# Patient Record
Sex: MALE | Race: WHITE | NOT HISPANIC OR LATINO | URBAN - METROPOLITAN AREA
[De-identification: names, ages, dates, MRNs, and addresses within clinical notes are randomized per-mention and may not be internally consistent; named-entity substitution may affect disease eponyms.]

---

## 2019-05-02 ENCOUNTER — INPATIENT (INPATIENT)
Facility: HOSPITAL | Age: 84
LOS: 6 days | Discharge: NURSING, REHAB OR LONG-TERM CARE | DRG: 871 | End: 2019-05-09
Attending: INTERNAL MEDICINE | Admitting: INTERNAL MEDICINE
Payer: MEDICARE

## 2019-05-02 VITALS
WEIGHT: 199.96 LBS | OXYGEN SATURATION: 93 % | SYSTOLIC BLOOD PRESSURE: 143 MMHG | DIASTOLIC BLOOD PRESSURE: 95 MMHG | RESPIRATION RATE: 32 BRPM | TEMPERATURE: 102 F | HEART RATE: 136 BPM | HEIGHT: 75 IN

## 2019-05-02 DIAGNOSIS — G20 PARKINSON'S DISEASE: ICD-10-CM

## 2019-05-02 DIAGNOSIS — Z95.1 PRESENCE OF AORTOCORONARY BYPASS GRAFT: Chronic | ICD-10-CM

## 2019-05-02 DIAGNOSIS — R09.02 HYPOXEMIA: ICD-10-CM

## 2019-05-02 DIAGNOSIS — Z29.9 ENCOUNTER FOR PROPHYLACTIC MEASURES, UNSPECIFIED: ICD-10-CM

## 2019-05-02 DIAGNOSIS — I10 ESSENTIAL (PRIMARY) HYPERTENSION: ICD-10-CM

## 2019-05-02 DIAGNOSIS — N18.9 CHRONIC KIDNEY DISEASE, UNSPECIFIED: ICD-10-CM

## 2019-05-02 DIAGNOSIS — E78.5 HYPERLIPIDEMIA, UNSPECIFIED: ICD-10-CM

## 2019-05-02 DIAGNOSIS — F03.90 UNSPECIFIED DEMENTIA WITHOUT BEHAVIORAL DISTURBANCE: ICD-10-CM

## 2019-05-02 DIAGNOSIS — J18.9 PNEUMONIA, UNSPECIFIED ORGANISM: ICD-10-CM

## 2019-05-02 DIAGNOSIS — I48.91 UNSPECIFIED ATRIAL FIBRILLATION: ICD-10-CM

## 2019-05-02 DIAGNOSIS — I63.9 CEREBRAL INFARCTION, UNSPECIFIED: ICD-10-CM

## 2019-05-02 LAB
24R-OH-CALCIDIOL SERPL-MCNC: 47.5 NG/ML — SIGNIFICANT CHANGE UP (ref 30–80)
ALBUMIN SERPL ELPH-MCNC: 2.1 G/DL — LOW (ref 3.5–5)
ALP SERPL-CCNC: 126 U/L — HIGH (ref 40–120)
ALT FLD-CCNC: 52 U/L DA — SIGNIFICANT CHANGE UP (ref 10–60)
ANION GAP SERPL CALC-SCNC: 4 MMOL/L — LOW (ref 5–17)
ANION GAP SERPL CALC-SCNC: 5 MMOL/L — SIGNIFICANT CHANGE UP (ref 5–17)
APPEARANCE UR: CLEAR — SIGNIFICANT CHANGE UP
APTT BLD: 59.6 SEC — HIGH (ref 27.5–36.3)
AST SERPL-CCNC: 93 U/L — HIGH (ref 10–40)
BASE EXCESS BLDA CALC-SCNC: -0.2 MMOL/L — SIGNIFICANT CHANGE UP (ref -2–2)
BASE EXCESS BLDA CALC-SCNC: 0.4 MMOL/L — SIGNIFICANT CHANGE UP (ref -2–2)
BASE EXCESS BLDV CALC-SCNC: -2.2 MMOL/L — LOW (ref -2–2)
BASOPHILS # BLD AUTO: 0.03 K/UL — SIGNIFICANT CHANGE UP (ref 0–0.2)
BASOPHILS NFR BLD AUTO: 0.2 % — SIGNIFICANT CHANGE UP (ref 0–2)
BILIRUB SERPL-MCNC: 1.6 MG/DL — HIGH (ref 0.2–1.2)
BILIRUB UR-MCNC: ABNORMAL
BLOOD GAS COMMENTS ARTERIAL: SIGNIFICANT CHANGE UP
BLOOD GAS COMMENTS ARTERIAL: SIGNIFICANT CHANGE UP
BUN SERPL-MCNC: 19 MG/DL — HIGH (ref 7–18)
BUN SERPL-MCNC: 21 MG/DL — HIGH (ref 7–18)
CALCIUM SERPL-MCNC: 7.4 MG/DL — LOW (ref 8.4–10.5)
CALCIUM SERPL-MCNC: 7.9 MG/DL — LOW (ref 8.4–10.5)
CHLORIDE SERPL-SCNC: 110 MMOL/L — HIGH (ref 96–108)
CHLORIDE SERPL-SCNC: 113 MMOL/L — HIGH (ref 96–108)
CHOLEST SERPL-MCNC: <50 MG/DL — SIGNIFICANT CHANGE UP (ref 10–199)
CO2 SERPL-SCNC: 26 MMOL/L — SIGNIFICANT CHANGE UP (ref 22–31)
CO2 SERPL-SCNC: 27 MMOL/L — SIGNIFICANT CHANGE UP (ref 22–31)
COLOR SPEC: YELLOW — SIGNIFICANT CHANGE UP
CREAT SERPL-MCNC: 1.18 MG/DL — SIGNIFICANT CHANGE UP (ref 0.5–1.3)
CREAT SERPL-MCNC: 1.36 MG/DL — HIGH (ref 0.5–1.3)
DIFF PNL FLD: ABNORMAL
EOSINOPHIL # BLD AUTO: 0.04 K/UL — SIGNIFICANT CHANGE UP (ref 0–0.5)
EOSINOPHIL NFR BLD AUTO: 0.2 % — SIGNIFICANT CHANGE UP (ref 0–6)
FLU A RESULT: SIGNIFICANT CHANGE UP
FLU A RESULT: SIGNIFICANT CHANGE UP
FLUAV AG NPH QL: SIGNIFICANT CHANGE UP
FLUBV AG NPH QL: SIGNIFICANT CHANGE UP
GLUCOSE SERPL-MCNC: 84 MG/DL — SIGNIFICANT CHANGE UP (ref 70–99)
GLUCOSE SERPL-MCNC: 96 MG/DL — SIGNIFICANT CHANGE UP (ref 70–99)
GLUCOSE UR QL: NEGATIVE — SIGNIFICANT CHANGE UP
HBA1C BLD-MCNC: 6.1 % — HIGH (ref 4–5.6)
HCO3 BLDA-SCNC: 23 MMOL/L — SIGNIFICANT CHANGE UP (ref 23–27)
HCO3 BLDA-SCNC: 24 MMOL/L — SIGNIFICANT CHANGE UP (ref 23–27)
HCO3 BLDV-SCNC: 24 MMOL/L — SIGNIFICANT CHANGE UP (ref 21–29)
HCT VFR BLD CALC: 33.6 % — LOW (ref 39–50)
HCT VFR BLD CALC: 40.7 % — SIGNIFICANT CHANGE UP (ref 39–50)
HDLC SERPL-MCNC: 27 MG/DL — LOW
HGB BLD-MCNC: 10.8 G/DL — LOW (ref 13–17)
HGB BLD-MCNC: 12.9 G/DL — LOW (ref 13–17)
HOROWITZ INDEX BLDA+IHG-RTO: 50 — SIGNIFICANT CHANGE UP
HOROWITZ INDEX BLDA+IHG-RTO: 60 — SIGNIFICANT CHANGE UP
HOROWITZ INDEX BLDV+IHG-RTO: 21 — SIGNIFICANT CHANGE UP
IMM GRANULOCYTES NFR BLD AUTO: 0.4 % — SIGNIFICANT CHANGE UP (ref 0–1.5)
INR BLD: 8.28 RATIO — CRITICAL HIGH (ref 0.88–1.16)
KETONES UR-MCNC: ABNORMAL
LACTATE SERPL-SCNC: 1.8 MMOL/L — SIGNIFICANT CHANGE UP (ref 0.7–2)
LACTATE SERPL-SCNC: 3 MMOL/L — HIGH (ref 0.7–2)
LEUKOCYTE ESTERASE UR-ACNC: ABNORMAL
LIDOCAIN IGE QN: 204 U/L — SIGNIFICANT CHANGE UP (ref 73–393)
LIPID PNL WITH DIRECT LDL SERPL: <17 MG/DL — SIGNIFICANT CHANGE UP
LYMPHOCYTES # BLD AUTO: 0.52 K/UL — LOW (ref 1–3.3)
LYMPHOCYTES # BLD AUTO: 3.2 % — LOW (ref 13–44)
MAGNESIUM SERPL-MCNC: 1.7 MG/DL — SIGNIFICANT CHANGE UP (ref 1.6–2.6)
MCHC RBC-ENTMCNC: 28.2 PG — SIGNIFICANT CHANGE UP (ref 27–34)
MCHC RBC-ENTMCNC: 28.4 PG — SIGNIFICANT CHANGE UP (ref 27–34)
MCHC RBC-ENTMCNC: 31.7 GM/DL — LOW (ref 32–36)
MCHC RBC-ENTMCNC: 32.1 GM/DL — SIGNIFICANT CHANGE UP (ref 32–36)
MCV RBC AUTO: 87.7 FL — SIGNIFICANT CHANGE UP (ref 80–100)
MCV RBC AUTO: 89.5 FL — SIGNIFICANT CHANGE UP (ref 80–100)
MONOCYTES # BLD AUTO: 0.6 K/UL — SIGNIFICANT CHANGE UP (ref 0–0.9)
MONOCYTES NFR BLD AUTO: 3.7 % — SIGNIFICANT CHANGE UP (ref 2–14)
NEUTROPHILS # BLD AUTO: 14.87 K/UL — HIGH (ref 1.8–7.4)
NEUTROPHILS NFR BLD AUTO: 92.3 % — HIGH (ref 43–77)
NITRITE UR-MCNC: NEGATIVE — SIGNIFICANT CHANGE UP
NRBC # BLD: 0 /100 WBCS — SIGNIFICANT CHANGE UP (ref 0–0)
NRBC # BLD: 0 /100 WBCS — SIGNIFICANT CHANGE UP (ref 0–0)
NT-PROBNP SERPL-SCNC: 5858 PG/ML — HIGH (ref 0–450)
PCO2 BLDA: 34 MMHG — SIGNIFICANT CHANGE UP (ref 32–46)
PCO2 BLDA: 38 MMHG — SIGNIFICANT CHANGE UP (ref 32–46)
PCO2 BLDV: 49 MMHG — SIGNIFICANT CHANGE UP (ref 35–50)
PH BLDA: 7.42 — SIGNIFICANT CHANGE UP (ref 7.35–7.45)
PH BLDA: 7.44 — SIGNIFICANT CHANGE UP (ref 7.35–7.45)
PH BLDV: 7.31 — LOW (ref 7.35–7.45)
PH UR: 5 — SIGNIFICANT CHANGE UP (ref 5–8)
PHOSPHATE SERPL-MCNC: 2.7 MG/DL — SIGNIFICANT CHANGE UP (ref 2.5–4.5)
PLATELET # BLD AUTO: 153 K/UL — SIGNIFICANT CHANGE UP (ref 150–400)
PLATELET # BLD AUTO: 232 K/UL — SIGNIFICANT CHANGE UP (ref 150–400)
PO2 BLDA: 117 MMHG — HIGH (ref 74–108)
PO2 BLDA: 77 MMHG — SIGNIFICANT CHANGE UP (ref 74–108)
PO2 BLDV: 30 MMHG — SIGNIFICANT CHANGE UP (ref 25–45)
POTASSIUM SERPL-MCNC: 3.2 MMOL/L — LOW (ref 3.5–5.3)
POTASSIUM SERPL-MCNC: 4.7 MMOL/L — SIGNIFICANT CHANGE UP (ref 3.5–5.3)
POTASSIUM SERPL-SCNC: 3.2 MMOL/L — LOW (ref 3.5–5.3)
POTASSIUM SERPL-SCNC: 4.7 MMOL/L — SIGNIFICANT CHANGE UP (ref 3.5–5.3)
PROT SERPL-MCNC: 6.6 G/DL — SIGNIFICANT CHANGE UP (ref 6–8.3)
PROT UR-MCNC: 30 MG/DL
PROTHROM AB SERPL-ACNC: 98.1 SEC — HIGH (ref 10–12.9)
RBC # BLD: 3.83 M/UL — LOW (ref 4.2–5.8)
RBC # BLD: 4.55 M/UL — SIGNIFICANT CHANGE UP (ref 4.2–5.8)
RBC # FLD: 18.2 % — HIGH (ref 10.3–14.5)
RBC # FLD: 18.5 % — HIGH (ref 10.3–14.5)
RSV RESULT: SIGNIFICANT CHANGE UP
RSV RNA RESP QL NAA+PROBE: SIGNIFICANT CHANGE UP
SAO2 % BLDA: 94 % — SIGNIFICANT CHANGE UP (ref 92–96)
SAO2 % BLDA: 98 % — HIGH (ref 92–96)
SAO2 % BLDV: 48 % — LOW (ref 67–88)
SODIUM SERPL-SCNC: 142 MMOL/L — SIGNIFICANT CHANGE UP (ref 135–145)
SODIUM SERPL-SCNC: 143 MMOL/L — SIGNIFICANT CHANGE UP (ref 135–145)
SP GR SPEC: 1.02 — SIGNIFICANT CHANGE UP (ref 1.01–1.02)
TOTAL CHOLESTEROL/HDL RATIO MEASUREMENT: <1.9 RATIO — LOW (ref 3.4–9.6)
TRIGL SERPL-MCNC: 30 MG/DL — SIGNIFICANT CHANGE UP (ref 10–149)
TROPONIN I SERPL-MCNC: 0.1 NG/ML — HIGH (ref 0–0.04)
TROPONIN I SERPL-MCNC: 1.57 NG/ML — HIGH (ref 0–0.04)
TSH SERPL-MCNC: 0.21 UU/ML — LOW (ref 0.34–4.82)
UROBILINOGEN FLD QL: 1
VIT B12 SERPL-MCNC: 1410 PG/ML — HIGH (ref 232–1245)
WBC # BLD: 16.13 K/UL — HIGH (ref 3.8–10.5)
WBC # BLD: 19.99 K/UL — HIGH (ref 3.8–10.5)
WBC # FLD AUTO: 16.13 K/UL — HIGH (ref 3.8–10.5)
WBC # FLD AUTO: 19.99 K/UL — HIGH (ref 3.8–10.5)

## 2019-05-02 PROCEDURE — 99284 EMERGENCY DEPT VISIT MOD MDM: CPT | Mod: 25

## 2019-05-02 PROCEDURE — 93010 ELECTROCARDIOGRAM REPORT: CPT | Mod: 76

## 2019-05-02 PROCEDURE — 71045 X-RAY EXAM CHEST 1 VIEW: CPT | Mod: 26

## 2019-05-02 RX ORDER — CARBIDOPA AND LEVODOPA 25; 100 MG/1; MG/1
1 TABLET ORAL
Qty: 0 | Refills: 0 | COMMUNITY

## 2019-05-02 RX ORDER — ATORVASTATIN CALCIUM 80 MG/1
40 TABLET, FILM COATED ORAL AT BEDTIME
Qty: 0 | Refills: 0 | Status: DISCONTINUED | OUTPATIENT
Start: 2019-05-02 | End: 2019-05-09

## 2019-05-02 RX ORDER — VANCOMYCIN HCL 1 G
1000 VIAL (EA) INTRAVENOUS ONCE
Qty: 0 | Refills: 0 | Status: COMPLETED | OUTPATIENT
Start: 2019-05-02 | End: 2019-05-02

## 2019-05-02 RX ORDER — CEFEPIME 1 G/1
2000 INJECTION, POWDER, FOR SOLUTION INTRAMUSCULAR; INTRAVENOUS ONCE
Qty: 0 | Refills: 0 | Status: COMPLETED | OUTPATIENT
Start: 2019-05-02 | End: 2019-05-02

## 2019-05-02 RX ORDER — POTASSIUM CHLORIDE 20 MEQ
10 PACKET (EA) ORAL EVERY 4 HOURS
Qty: 0 | Refills: 0 | Status: COMPLETED | OUTPATIENT
Start: 2019-05-02 | End: 2019-05-02

## 2019-05-02 RX ORDER — TAMSULOSIN HYDROCHLORIDE 0.4 MG/1
1 CAPSULE ORAL
Qty: 0 | Refills: 0 | COMMUNITY

## 2019-05-02 RX ORDER — METOPROLOL TARTRATE 50 MG
1 TABLET ORAL
Qty: 0 | Refills: 0 | COMMUNITY

## 2019-05-02 RX ORDER — PIPERACILLIN AND TAZOBACTAM 4; .5 G/20ML; G/20ML
3.38 INJECTION, POWDER, LYOPHILIZED, FOR SOLUTION INTRAVENOUS EVERY 8 HOURS
Qty: 0 | Refills: 0 | Status: DISCONTINUED | OUTPATIENT
Start: 2019-05-02 | End: 2019-05-09

## 2019-05-02 RX ORDER — FINASTERIDE 5 MG/1
1 TABLET, FILM COATED ORAL
Qty: 0 | Refills: 0 | COMMUNITY

## 2019-05-02 RX ORDER — CHOLECALCIFEROL (VITAMIN D3) 125 MCG
1 CAPSULE ORAL
Qty: 0 | Refills: 0 | COMMUNITY

## 2019-05-02 RX ORDER — VANCOMYCIN HCL 1 G
1000 VIAL (EA) INTRAVENOUS EVERY 24 HOURS
Qty: 0 | Refills: 0 | Status: DISCONTINUED | OUTPATIENT
Start: 2019-05-02 | End: 2019-05-03

## 2019-05-02 RX ORDER — KETOROLAC TROMETHAMINE 30 MG/ML
30 SYRINGE (ML) INJECTION ONCE
Qty: 0 | Refills: 0 | Status: DISCONTINUED | OUTPATIENT
Start: 2019-05-02 | End: 2019-05-02

## 2019-05-02 RX ORDER — PYRIDOXINE HCL (VITAMIN B6) 100 MG
1 TABLET ORAL
Qty: 0 | Refills: 0 | COMMUNITY

## 2019-05-02 RX ORDER — ATORVASTATIN CALCIUM 80 MG/1
1 TABLET, FILM COATED ORAL
Qty: 0 | Refills: 0 | COMMUNITY

## 2019-05-02 RX ORDER — LOSARTAN POTASSIUM 100 MG/1
50 TABLET, FILM COATED ORAL DAILY
Qty: 0 | Refills: 0 | Status: DISCONTINUED | OUTPATIENT
Start: 2019-05-02 | End: 2019-05-03

## 2019-05-02 RX ORDER — ACETAMINOPHEN 500 MG
650 TABLET ORAL ONCE
Qty: 0 | Refills: 0 | Status: DISCONTINUED | OUTPATIENT
Start: 2019-05-02 | End: 2019-05-02

## 2019-05-02 RX ORDER — TAMSULOSIN HYDROCHLORIDE 0.4 MG/1
0.4 CAPSULE ORAL AT BEDTIME
Qty: 0 | Refills: 0 | Status: DISCONTINUED | OUTPATIENT
Start: 2019-05-02 | End: 2019-05-09

## 2019-05-02 RX ORDER — IPRATROPIUM/ALBUTEROL SULFATE 18-103MCG
3 AEROSOL WITH ADAPTER (GRAM) INHALATION EVERY 6 HOURS
Qty: 0 | Refills: 0 | Status: DISCONTINUED | OUTPATIENT
Start: 2019-05-02 | End: 2019-05-09

## 2019-05-02 RX ORDER — FUROSEMIDE 40 MG
1 TABLET ORAL
Qty: 0 | Refills: 0 | COMMUNITY

## 2019-05-02 RX ORDER — SODIUM CHLORIDE 9 MG/ML
2000 INJECTION INTRAMUSCULAR; INTRAVENOUS; SUBCUTANEOUS ONCE
Qty: 0 | Refills: 0 | Status: COMPLETED | OUTPATIENT
Start: 2019-05-02 | End: 2019-05-02

## 2019-05-02 RX ORDER — ACETAMINOPHEN 500 MG
1000 TABLET ORAL ONCE
Qty: 0 | Refills: 0 | Status: COMPLETED | OUTPATIENT
Start: 2019-05-02 | End: 2019-05-02

## 2019-05-02 RX ORDER — IPRATROPIUM/ALBUTEROL SULFATE 18-103MCG
3 AEROSOL WITH ADAPTER (GRAM) INHALATION
Qty: 0 | Refills: 0 | COMMUNITY

## 2019-05-02 RX ORDER — PYRIDOXINE HCL (VITAMIN B6) 100 MG
50 TABLET ORAL DAILY
Qty: 0 | Refills: 0 | Status: DISCONTINUED | OUTPATIENT
Start: 2019-05-02 | End: 2019-05-09

## 2019-05-02 RX ORDER — CHOLECALCIFEROL (VITAMIN D3) 125 MCG
400 CAPSULE ORAL DAILY
Qty: 0 | Refills: 0 | Status: DISCONTINUED | OUTPATIENT
Start: 2019-05-02 | End: 2019-05-09

## 2019-05-02 RX ORDER — FINASTERIDE 5 MG/1
5 TABLET, FILM COATED ORAL DAILY
Qty: 0 | Refills: 0 | Status: DISCONTINUED | OUTPATIENT
Start: 2019-05-02 | End: 2019-05-09

## 2019-05-02 RX ORDER — PREGABALIN 225 MG/1
1 CAPSULE ORAL
Qty: 0 | Refills: 0 | COMMUNITY

## 2019-05-02 RX ORDER — AMANTADINE HCL 100 MG
1 CAPSULE ORAL
Qty: 0 | Refills: 0 | COMMUNITY

## 2019-05-02 RX ORDER — SODIUM CHLORIDE 9 MG/ML
1000 INJECTION INTRAMUSCULAR; INTRAVENOUS; SUBCUTANEOUS
Qty: 0 | Refills: 0 | Status: DISCONTINUED | OUTPATIENT
Start: 2019-05-02 | End: 2019-05-03

## 2019-05-02 RX ORDER — CARBIDOPA AND LEVODOPA 25; 100 MG/1; MG/1
1 TABLET ORAL DAILY
Qty: 0 | Refills: 0 | Status: DISCONTINUED | OUTPATIENT
Start: 2019-05-02 | End: 2019-05-09

## 2019-05-02 RX ADMIN — CEFEPIME 2000 MILLIGRAM(S): 1 INJECTION, POWDER, FOR SOLUTION INTRAMUSCULAR; INTRAVENOUS at 03:15

## 2019-05-02 RX ADMIN — Medication 30 MILLIGRAM(S): at 02:45

## 2019-05-02 RX ADMIN — PIPERACILLIN AND TAZOBACTAM 25 GRAM(S): 4; .5 INJECTION, POWDER, LYOPHILIZED, FOR SOLUTION INTRAVENOUS at 16:58

## 2019-05-02 RX ADMIN — Medication 30 MILLIGRAM(S): at 01:43

## 2019-05-02 RX ADMIN — Medication 1000 MILLIGRAM(S): at 02:30

## 2019-05-02 RX ADMIN — Medication 3 MILLILITER(S): at 15:55

## 2019-05-02 RX ADMIN — Medication 3 MILLILITER(S): at 10:14

## 2019-05-02 RX ADMIN — SODIUM CHLORIDE 2000 MILLILITER(S): 9 INJECTION INTRAMUSCULAR; INTRAVENOUS; SUBCUTANEOUS at 03:30

## 2019-05-02 RX ADMIN — Medication 250 MILLIGRAM(S): at 01:44

## 2019-05-02 RX ADMIN — Medication 1000 MILLIGRAM(S): at 02:45

## 2019-05-02 RX ADMIN — SODIUM CHLORIDE 75 MILLILITER(S): 9 INJECTION INTRAMUSCULAR; INTRAVENOUS; SUBCUTANEOUS at 06:40

## 2019-05-02 RX ADMIN — Medication 100 MILLIEQUIVALENT(S): at 18:07

## 2019-05-02 RX ADMIN — Medication 400 MILLIGRAM(S): at 01:43

## 2019-05-02 RX ADMIN — CEFEPIME 100 MILLIGRAM(S): 1 INJECTION, POWDER, FOR SOLUTION INTRAMUSCULAR; INTRAVENOUS at 01:44

## 2019-05-02 RX ADMIN — Medication 100 MILLIEQUIVALENT(S): at 16:58

## 2019-05-02 RX ADMIN — SODIUM CHLORIDE 2000 MILLILITER(S): 9 INJECTION INTRAMUSCULAR; INTRAVENOUS; SUBCUTANEOUS at 01:43

## 2019-05-02 RX ADMIN — Medication 3 MILLILITER(S): at 21:59

## 2019-05-02 RX ADMIN — Medication 1000 MILLIGRAM(S): at 03:30

## 2019-05-02 NOTE — H&P ADULT - PROBLEM SELECTOR PLAN 10
IMPROVE VTE Individual Risk Assessment          RISK                                                          Points  [  ] Previous VTE                                                3  [  ] Thrombophilia                                             2  [  ] Lower limb paralysis                                   2        (unable to hold up >15 seconds)    [  ] Current Cancer                                             2         (within 6 months)  [ x ] Immobilization > 24 hrs                              1  [  ] ICU/CCU stay > 24 hours                             1  [ x ] Age > 60                                                         1    IMPROVE VTE Score: 2    no need for vte prophylaxis for now given supratherapeutic INR  f/u INR daily

## 2019-05-02 NOTE — H&P ADULT - HISTORY OF PRESENT ILLNESS
84 year old male PMH CAD, CVA, dementia, ckd, BPH, parkinsons, afib coumadin, HTN, CHF, PVD, HLD and recently treate for pna about 2 weeks ago BIBA from NH for SOB. unable to obtain further history from pt 2/2 dementia. 84 year old male PMH CAD, CVA, CKD, BPH, Parkinson's, dementia, Afib on coumadin, HTN, CHF, PVD, HLD was brought in by ambulance from [facility] for shortness of breath. . unable to obtain further history from pt 2/2 dementia. According to ED provider, son states that patient at baseline follows basic commands and is confused; FULL CODE.

## 2019-05-02 NOTE — ED PROVIDER NOTE - OBJECTIVE STATEMENT
84 year old male PMH CAD, CVA, dementia, ckd, BPH, parkinsons, HTN, CHF, PVD, HLD and recently treate for pna about 2 weeks ago BIBA from NH for SOB. unable to obtain further history from pt 2/2 dementia. at baseline follows basic commands and is confused. 84 year old male PMH CAD, CVA, dementia, ckd, BPH, parkinsons, afib coumadin, HTN, CHF, PVD, HLD and recently treate for pna about 2 weeks ago BIBA from NH for SOB. unable to obtain further history from pt 2/2 dementia. at baseline follows basic commands and is confused.

## 2019-05-02 NOTE — H&P ADULT - NSHPPHYSICALEXAM_GEN_ALL_CORE
Vital Signs Last 24 Hrs  T(C): 36.9 (02 May 2019 04:30), Max: 39.1 (02 May 2019 01:06)  T(F): 98.4 (02 May 2019 04:30), Max: 102.4 (02 May 2019 01:06)  HR: 100 (02 May 2019 04:30) (100 - 136)  BP: 110/62 (02 May 2019 04:30) (104/66 - 143/95)  RR: 22 (02 May 2019 04:30) (22 - 32)  SpO2: 98% (02 May 2019 04:30) (93% - 98%)  ________________________________________________  PHYSICAL EXAM:  GENERAL: moderate respiratory distress, flailing around in bed  HEENT: Normocephalic;  conjunctivae and sclerae clear; dry mucous membranes  NECK : supple, no JVD  CHEST/LUNG: Clear to auscuitation bilaterally with good air entry   HEART: S1 S2  regular; no murmurs, gallops or rubs  ABDOMEN: Soft, Nontender, Nondistended; Bowel sounds present  EXTREMITIES: no cyanosis; no edema; no calf tenderness  NERVOUS SYSTEM:  lethargic; opens eyes in response to name; unable to assess orientation due to nonverbal status; patient freely moving all extremities Vital Signs Last 24 Hrs  T(C): 36.9 (02 May 2019 04:30), Max: 39.1 (02 May 2019 01:06)  T(F): 98.4 (02 May 2019 04:30), Max: 102.4 (02 May 2019 01:06)  HR: 100 (02 May 2019 04:30) (100 - 136)  BP: 110/62 (02 May 2019 04:30) (104/66 - 143/95)  RR: 22 (02 May 2019 04:30) (22 - 32)  SpO2: 98% (02 May 2019 04:30) (93% - 98%)  ________________________________________________  PHYSICAL EXAM:  GENERAL: moderate respiratory distress, flailing around in bed  HEENT: Normocephalic;  conjunctivae and sclerae clear; dry mucous membranes  NECK : supple, no JVD  CHEST/LUNG: Clear to auscuitation bilaterally with good air entry   HEART: S1 S2  regular; no murmurs, gallops or rubs  ABDOMEN: Soft, Nontender, Nondistended; Bowel sounds present  EXTREMITIES: no cyanosis; no edema; no calf tenderness  NERVOUS SYSTEM:  lethargic; opens eyes in response to name; unable to assess orientation due to nonverbal status; patient freely moving all extremities; not necessarily following commands

## 2019-05-02 NOTE — H&P ADULT - PROBLEM SELECTOR PLAN 9
Cr 1.36 on admission, approximately around baseline  continue to monitor BP  renally dose all medications

## 2019-05-02 NOTE — H&P ADULT - PROBLEM SELECTOR PLAN 1
initially started on BIPAP in ED with improvement in hypoxia, transitioned to high flow nasal cannula 50% FiO2@50Lpm with O2 sat at 100%  CXR left upper lobe infiltrate  likely pneumonia in setting of fever, tachycardia, leukocytosis, recently treated on PO antibiotics 8 days ago as per NH  will continue with vancomycin, zosyn for now, treating for HAP  f/u blood cultures, strep antigen, urine legionella  f/u ID consult  continue to monitor respiratory status  patient still encephalopathic, likely secondary to sepsis  keep pt NPO for now initially started on BIPAP in ED with improvement in hypoxia, transitioned to high flow nasal cannula 50% FiO2@50Lpm with O2 sat at 100%  CXR left upper lobe infiltrate  likely pneumonia in setting of fever, tachycardia, leukocytosis, recently treated on PO antibiotics 8 days ago as per NH  will continue with vancomycin, zosyn for now, treating for HAP  f/u blood cultures, strep antigen, urine legionella  f/u ID consult  continue to monitor respiratory status  patient still encephalopathic, likely secondary to sepsis  keep pt NPO for now  NSTEMI with intraventricular block - initial trop 0.1, f/u T2, T3 - avoid AV galina blocking agents

## 2019-05-02 NOTE — SWALLOW BEDSIDE ASSESSMENT ADULT - PHARYNGEAL PHASE
Delayed pharyngeal swallow/Absent laryngeal elevation/Absent trigger of swallow/Decreased laryngeal elevation/Throat clear post oral intake

## 2019-05-02 NOTE — H&P ADULT - PROBLEM SELECTOR PLAN 3
supratherapeutic INR on admission to 8.3  no evidence of bleeding  holding coumadin for now  f/u INR daily

## 2019-05-02 NOTE — H&P ADULT - ASSESSMENT
Patient admitted for shortness of breath, found to be in sepsis likely secondary to UTI, initially started on BIPAP in ED and transitioned to high flow nasal cannula on 50% FiO2.

## 2019-05-02 NOTE — H&P ADULT - ATTENDING COMMENTS
83 y/o m admitted from SNF with sob and found to have left pn and hypoxemia. Ws treated for pn. last week. Also found to have supertx INR  Pt improved after fluids-bipap, antibx-and now on high flow 02  Plan; antibx/02 ID/holding anticx 85 y/o m admitted from SNF with sob and found to have left pn and hypoxemia. Ws treated for pn. last week. Also found to have supertx INR  Pt improved after fluids-bipap, antibx-and now on high flow 02  PMH; af,htn,hld,cva,bph,pd,dementia,chf,pvd,  Plan; antibx/02 ID/holding anticx

## 2019-05-02 NOTE — H&P ADULT - NSHPSOCIALHISTORY_GEN_ALL_CORE
from [facility], nonverbal baseline, confused, follows basic commands as per son

## 2019-05-02 NOTE — ED PROVIDER NOTE - CLINICAL SUMMARY MEDICAL DECISION MAKING FREE TEXT BOX
84 year old male for sob. febrile, tachycardic, tachypnic. PE as above.  on arrival placed on bipap given hx chf.  labs significant for leukocytosis, elevated trop, elevated BNP, elevated lactate.  given fluid bolud 2L NS, toradol/tylenol for fever, broad spectrum abx.  switched from bipap to high flow NC.  ecg sinus tachycardia.  pt stable, tachycardia and tachypnea resolved. will admit to tele for sepsis 2/2 pneumonia.

## 2019-05-02 NOTE — H&P ADULT - NSHPLABSRESULTS_GEN_ALL_CORE
LABS:  ABG - ( 02 May 2019 06:01 )  pH, Arterial: 7.42  pH, Blood: x     /  pCO2: 38    /  pO2: 77    / HCO3: 24    / Base Excess: 0.4   /  SaO2: 94                  CBC Full  -  ( 02 May 2019 01:39 )  WBC Count : 16.13 K/uL  RBC Count : 4.55 M/uL  Hemoglobin : 12.9 g/dL  Hematocrit : 40.7 %  Platelet Count - Automated : 232 K/uL  Mean Cell Volume : 89.5 fl  Mean Cell Hemoglobin : 28.4 pg  Mean Cell Hemoglobin Concentration : 31.7 gm/dL  Auto Neutrophil # : 14.87 K/uL  Auto Lymphocyte # : 0.52 K/uL  Auto Monocyte # : 0.60 K/uL  Auto Eosinophil # : 0.04 K/uL  Auto Basophil # : 0.03 K/uL  Auto Neutrophil % : 92.3 %  Auto Lymphocyte % : 3.2 %  Auto Monocyte % : 3.7 %  Auto Eosinophil % : 0.2 %  Auto Basophil % : 0.2 %        142  |  110<H>  |  19<H>  ----------------------------<  96  4.7   |  27  |  1.36<H>    Ca    7.9<L>      02 May 2019 01:39    TPro  6.6  /  Alb  2.1<L>  /  TBili  1.6<H>  /  DBili  x   /  AST  93<H>  /  ALT  52  /  AlkPhos  126<H>  05-02    PT/INR - ( 02 May 2019 02:46 )   PT: 98.1 sec;   INR: 8.28 ratio      PTT - ( 02 May 2019 02:46 )  PTT:59.6 sec    Urinalysis Basic - ( 02 May 2019 02:46 )    Color: Yellow / Appearance: Clear / S.020 / pH: x  Gluc: x / Ketone: Trace  / Bili: Small / Urobili: 1   Blood: x / Protein: 30 mg/dL / Nitrite: Negative   Leuk Esterase: Trace / RBC: 2-5 /HPF / WBC 3-5 /HPF   Sq Epi: x / Non Sq Epi: x / Bacteria: x    RADIOLOGY & ADDITIONAL STUDIES (The following images were personally reviewed):    CXR: Left upper lobe infiltrate LABS:  ABG - ( 02 May 2019 06:01 )  pH, Arterial: 7.42  pH, Blood: x     /  pCO2: 38    /  pO2: 77    / HCO3: 24    / Base Excess: 0.4   /  SaO2: 94          CBC Full  -  ( 02 May 2019 01:39 )  WBC Count : 16.13 K/uL  RBC Count : 4.55 M/uL  Hemoglobin : 12.9 g/dL  Hematocrit : 40.7 %  Platelet Count - Automated : 232 K/uL  Mean Cell Volume : 89.5 fl  Mean Cell Hemoglobin : 28.4 pg  Mean Cell Hemoglobin Concentration : 31.7 gm/dL  Auto Neutrophil # : 14.87 K/uL  Auto Lymphocyte # : 0.52 K/uL  Auto Monocyte # : 0.60 K/uL  Auto Eosinophil # : 0.04 K/uL  Auto Basophil # : 0.03 K/uL  Auto Neutrophil % : 92.3 %  Auto Lymphocyte % : 3.2 %  Auto Monocyte % : 3.7 %  Auto Eosinophil % : 0.2 %  Auto Basophil % : 0.2 %        142  |  110<H>  |  19<H>  ----------------------------<  96  4.7   |  27  |  1.36<H>    Ca    7.9<L>      02 May 2019 01:39    TPro  6.6  /  Alb  2.1<L>  /  TBili  1.6<H>  /  DBili  x   /  AST  93<H>  /  ALT  52  /  AlkPhos  126<H>  05-02    PT/INR - ( 02 May 2019 02:46 )   PT: 98.1 sec;   INR: 8.28 ratio      PTT - ( 02 May 2019 02:46 )  PTT:59.6 sec    Urinalysis Basic - ( 02 May 2019 02:46 )    Color: Yellow / Appearance: Clear / S.020 / pH: x  Gluc: x / Ketone: Trace  / Bili: Small / Urobili: 1   Blood: x / Protein: 30 mg/dL / Nitrite: Negative   Leuk Esterase: Trace / RBC: 2-5 /HPF / WBC 3-5 /HPF   Sq Epi: x / Non Sq Epi: x / Bacteria: x    RADIOLOGY & ADDITIONAL STUDIES (The following images were personally reviewed):    CXR: Left upper lobe infiltrate

## 2019-05-02 NOTE — H&P ADULT - NSICDXPASTMEDICALHX_GEN_ALL_CORE_FT
PAST MEDICAL HISTORY:  Afib on coumadin    CAD (coronary artery disease) s/p cabg    Cerebrovascular accident (CVA)     Chronic CHF     Chronic kidney disease (CKD)     Dementia     HLD (hyperlipidemia)     HTN (hypertension)     Parkinsons

## 2019-05-02 NOTE — ED ADULT NURSE REASSESSMENT NOTE - NS ED NURSE REASSESS COMMENT FT1
Pt remains stable, lethargic, no s/s of any distress noted. IV line in place, IV fluids infusing as per orders, no signs of infiltration noted. NPO maintained. VS WNL. Call bell in reach, bed in lowest position. Safety maintained, hourly rounding performed. Pt on cardiac monitor and high flow nasal canula. Troponin elevated, EKG done, MD. EMELY [place] aware, no further nursing interventions ordered. Endorsed to nurse Profit.

## 2019-05-02 NOTE — SWALLOW BEDSIDE ASSESSMENT ADULT - NS SPL SWALLOW CLINIC TRIAL FT
Swallow only initiated 1x; Pt fell asleep w/ PO, in oral cavity, despite maximal multimodal prompts. Aroused but then did not initiate oral action for > 60 seconds; absent trigger of swallow reflex for > 40 seconds; Weak hyolaryngeal elevation, AMS & pt swallow profile place patient at risk for aspiration. Ice chips retrieved between trials. Lower anterior sulci periodically swabbed during exam to remove pooled liquids. Exam terminated.

## 2019-05-02 NOTE — H&P ADULT - PROBLEM SELECTOR PLAN 2
see plan as above  f/u blood cultures, strep antigen, legionella antigen  c/w vancomycin, zosyn  patient is FULL CODE as per son

## 2019-05-02 NOTE — CHART NOTE - NSCHARTNOTEFT_GEN_A_CORE
Notified by nursing staff that patient's troponin increased to 1.57. Stat EKG is of improved quality when compared to admission. Patient examined at bedside. Patient is lethargic appearing and minimally arousable. As per NP following patient during day time, patient has had persistently poor mental status. Vitals at bedside stable. Patient saturating 95% on high flow O2. Will continue to monitor. Notified by nursing staff that patient's troponin increased to 1.57. Stat EKG is of improved quality when compared to admission with no evident ST or T wave changes. Patient examined at bedside. Patient is lethargic appearing and minimally arousable. As per NP following patient during day time, patient has had persistently poor mental status. Vitals at bedside stable. Patient saturating 95% on high flow O2. Will continue to monitor.

## 2019-05-03 DIAGNOSIS — J18.9 PNEUMONIA, UNSPECIFIED ORGANISM: ICD-10-CM

## 2019-05-03 DIAGNOSIS — R74.8 ABNORMAL LEVELS OF OTHER SERUM ENZYMES: ICD-10-CM

## 2019-05-03 PROBLEM — Z00.00 ENCOUNTER FOR PREVENTIVE HEALTH EXAMINATION: Status: ACTIVE | Noted: 2019-05-03

## 2019-05-03 LAB
ALBUMIN SERPL ELPH-MCNC: 1.9 G/DL — LOW (ref 3.5–5)
ALP SERPL-CCNC: 94 U/L — SIGNIFICANT CHANGE UP (ref 40–120)
ALT FLD-CCNC: 46 U/L DA — SIGNIFICANT CHANGE UP (ref 10–60)
ANION GAP SERPL CALC-SCNC: 7 MMOL/L — SIGNIFICANT CHANGE UP (ref 5–17)
AST SERPL-CCNC: 60 U/L — HIGH (ref 10–40)
BILIRUB SERPL-MCNC: 2.4 MG/DL — HIGH (ref 0.2–1.2)
BUN SERPL-MCNC: 25 MG/DL — HIGH (ref 7–18)
CALCIUM SERPL-MCNC: 7.8 MG/DL — LOW (ref 8.4–10.5)
CHLORIDE SERPL-SCNC: 114 MMOL/L — HIGH (ref 96–108)
CK MB BLD-MCNC: 5.5 % — HIGH (ref 0–3.5)
CK MB CFR SERPL CALC: 8.5 NG/ML — HIGH (ref 0–3.6)
CK SERPL-CCNC: 154 U/L — SIGNIFICANT CHANGE UP (ref 35–232)
CO2 SERPL-SCNC: 25 MMOL/L — SIGNIFICANT CHANGE UP (ref 22–31)
CREAT SERPL-MCNC: 1.36 MG/DL — HIGH (ref 0.5–1.3)
CULTURE RESULTS: SIGNIFICANT CHANGE UP
GLUCOSE SERPL-MCNC: 81 MG/DL — SIGNIFICANT CHANGE UP (ref 70–99)
HCT VFR BLD CALC: 38 % — LOW (ref 39–50)
HGB BLD-MCNC: 12 G/DL — LOW (ref 13–17)
INR BLD: 11.04 RATIO — CRITICAL HIGH (ref 0.88–1.16)
INR BLD: 9.16 RATIO — CRITICAL HIGH (ref 0.88–1.16)
MCHC RBC-ENTMCNC: 28.3 PG — SIGNIFICANT CHANGE UP (ref 27–34)
MCHC RBC-ENTMCNC: 31.6 GM/DL — LOW (ref 32–36)
MCV RBC AUTO: 89.6 FL — SIGNIFICANT CHANGE UP (ref 80–100)
NRBC # BLD: 0 /100 WBCS — SIGNIFICANT CHANGE UP (ref 0–0)
PLATELET # BLD AUTO: 164 K/UL — SIGNIFICANT CHANGE UP (ref 150–400)
POTASSIUM SERPL-MCNC: 3.8 MMOL/L — SIGNIFICANT CHANGE UP (ref 3.5–5.3)
POTASSIUM SERPL-SCNC: 3.8 MMOL/L — SIGNIFICANT CHANGE UP (ref 3.5–5.3)
PROT SERPL-MCNC: 5.7 G/DL — LOW (ref 6–8.3)
PROTHROM AB SERPL-ACNC: 108.9 SEC — HIGH (ref 10–12.9)
PROTHROM AB SERPL-ACNC: 132 SEC — HIGH (ref 10–12.9)
RBC # BLD: 4.24 M/UL — SIGNIFICANT CHANGE UP (ref 4.2–5.8)
RBC # FLD: 18.7 % — HIGH (ref 10.3–14.5)
S PNEUM AG SER QL: SIGNIFICANT CHANGE UP
SODIUM SERPL-SCNC: 146 MMOL/L — HIGH (ref 135–145)
SPECIMEN SOURCE: SIGNIFICANT CHANGE UP
TROPONIN I SERPL-MCNC: 0.72 NG/ML — HIGH (ref 0–0.04)
WBC # BLD: 13.85 K/UL — HIGH (ref 3.8–10.5)
WBC # FLD AUTO: 13.85 K/UL — HIGH (ref 3.8–10.5)

## 2019-05-03 RX ORDER — PHYTONADIONE (VIT K1) 5 MG
2.5 TABLET ORAL ONCE
Qty: 0 | Refills: 0 | Status: COMPLETED | OUTPATIENT
Start: 2019-05-03 | End: 2019-05-03

## 2019-05-03 RX ORDER — DIGOXIN 250 MCG
0.25 TABLET ORAL ONCE
Qty: 0 | Refills: 0 | Status: COMPLETED | OUTPATIENT
Start: 2019-05-03 | End: 2019-05-03

## 2019-05-03 RX ORDER — METOPROLOL TARTRATE 50 MG
25 TABLET ORAL EVERY 8 HOURS
Qty: 0 | Refills: 0 | Status: DISCONTINUED | OUTPATIENT
Start: 2019-05-03 | End: 2019-05-07

## 2019-05-03 RX ADMIN — Medication 25 MILLIGRAM(S): at 13:17

## 2019-05-03 RX ADMIN — PIPERACILLIN AND TAZOBACTAM 25 GRAM(S): 4; .5 INJECTION, POWDER, LYOPHILIZED, FOR SOLUTION INTRAVENOUS at 02:32

## 2019-05-03 RX ADMIN — PIPERACILLIN AND TAZOBACTAM 25 GRAM(S): 4; .5 INJECTION, POWDER, LYOPHILIZED, FOR SOLUTION INTRAVENOUS at 13:16

## 2019-05-03 RX ADMIN — Medication 400 UNIT(S): at 11:36

## 2019-05-03 RX ADMIN — Medication 25 MILLIGRAM(S): at 21:24

## 2019-05-03 RX ADMIN — Medication 50 MILLIGRAM(S): at 11:36

## 2019-05-03 RX ADMIN — Medication 3 MILLILITER(S): at 15:45

## 2019-05-03 RX ADMIN — CARBIDOPA AND LEVODOPA 1 TABLET(S): 25; 100 TABLET ORAL at 11:36

## 2019-05-03 RX ADMIN — Medication 2.5 MILLIGRAM(S): at 11:37

## 2019-05-03 RX ADMIN — Medication 0.25 MILLIGRAM(S): at 13:17

## 2019-05-03 RX ADMIN — Medication 3 MILLILITER(S): at 09:38

## 2019-05-03 RX ADMIN — PIPERACILLIN AND TAZOBACTAM 25 GRAM(S): 4; .5 INJECTION, POWDER, LYOPHILIZED, FOR SOLUTION INTRAVENOUS at 21:17

## 2019-05-03 RX ADMIN — Medication 3 MILLILITER(S): at 21:09

## 2019-05-03 RX ADMIN — TAMSULOSIN HYDROCHLORIDE 0.4 MILLIGRAM(S): 0.4 CAPSULE ORAL at 02:32

## 2019-05-03 RX ADMIN — Medication 3 MILLILITER(S): at 02:32

## 2019-05-03 RX ADMIN — TAMSULOSIN HYDROCHLORIDE 0.4 MILLIGRAM(S): 0.4 CAPSULE ORAL at 21:24

## 2019-05-03 RX ADMIN — FINASTERIDE 5 MILLIGRAM(S): 5 TABLET, FILM COATED ORAL at 11:36

## 2019-05-03 RX ADMIN — Medication 250 MILLIGRAM(S): at 07:16

## 2019-05-03 RX ADMIN — ATORVASTATIN CALCIUM 40 MILLIGRAM(S): 80 TABLET, FILM COATED ORAL at 02:32

## 2019-05-03 RX ADMIN — ATORVASTATIN CALCIUM 40 MILLIGRAM(S): 80 TABLET, FILM COATED ORAL at 21:24

## 2019-05-03 RX ADMIN — LOSARTAN POTASSIUM 50 MILLIGRAM(S): 100 TABLET, FILM COATED ORAL at 07:16

## 2019-05-03 NOTE — CONSULT NOTE ADULT - ASSESSMENT
84 year old male PMH CAD, CVA, CKD, BPH, Parkinson's, dementia, Afib on coumadin, HTN, CHF, PVD, HLD was brought in by ambulance from [facility] for shortness of breath,Lt sided pneumonia and + troponis due to demand ischemia-Type II MI.  1.Tele monitoring.  2.ABX.  3.Echocardiogram.  4.Afib-dig .25mg ivx1,add lopressor 25mg q8h,hold coumadin.  5.Elevated INR-Vit k.   6.CAD-asa,b blocker,statin.  7.D/C Cozaar for now.  8.CVA-AC on hold,statin.  9.BPH-flomax,proscar.
Aspiration Pneumonia - most likely  Leukocytosis    plan - cont zosyn 3.375gms iv q8hrs  dc vancomycin

## 2019-05-03 NOTE — SWALLOW BEDSIDE ASSESSMENT ADULT - ASR SWALLOW ASPIRATION MONITOR
pneumonia/upper respiratory infection/change of breathing pattern/cough/gurgly voice/oral hygiene/position upright (90Y)/fever/throat clearing
oral hygiene/pneumonia/position upright (90Y)/fever/throat clearing/change of breathing pattern/gurgly voice/upper respiratory infection/cough

## 2019-05-03 NOTE — PROGRESS NOTE ADULT - PROBLEM SELECTOR PLAN 6
confused at baseline, follows basic commands as per son Confused at baseline, follows basic commands as per son  - Today AAOx1-2, following commands

## 2019-05-03 NOTE — SWALLOW BEDSIDE ASSESSMENT ADULT - SWALLOW EVAL: RECOMMENDED FEEDING/EATING TECHNIQUES
alternate food with liquid/allow for swallow between intakes/small sips/bites/oral hygiene/crush medication (when feasible)/position upright (90 degrees)

## 2019-05-03 NOTE — PROGRESS NOTE ADULT - ASSESSMENT
Patient admitted for shortness of breath, found to be in sepsis likely secondary to UTI, initially started on BIPAP in ED and transitioned to high flow nasal cannula on 50% FiO2. Patient admitted for shortness of breath, found to be in sepsis likely secondary to UTI, initially started on BIPAP in ED and transitioned to high flow nasal cannula on 50% FiO2.     GOC: DNR DNI

## 2019-05-03 NOTE — PROGRESS NOTE ADULT - ASSESSMENT
-HCAP-left mid lung  -elevated troponin-? demand ischemia  -supertx INR  -HX; CAD,AF,HTN,CHF,HLD,PVD CVA,DEMENTIA, PD, CKD,BPH    PLAN; ZOSYN/VANCO-ID           F/U INR           CARDIO EVAL-ECHO -HCAP-left mid lung  -elevated troponin-? demand ischemia  -supertx INR  -HX; CAD,AF,HTN,CHF,HLD,PVD CVA,DEMENTIA, PD, CKD,BPH    PLAN; ZOSYN/VANCO-ID           F/U INR           CARDIO EVAL-ECHO           DYSPHAGIA DIET

## 2019-05-03 NOTE — SWALLOW BEDSIDE ASSESSMENT ADULT - PHARYNGEAL PHASE
Decreased laryngeal elevation/Delayed pharyngeal swallow/Multiple swallows Delayed pharyngeal swallow/Cough post oral intake/Throat clear post oral intake/Decreased laryngeal elevation/Delayed cough post oral intake/Delayed throat clear post oral intake/Multiple swallows

## 2019-05-03 NOTE — SWALLOW BEDSIDE ASSESSMENT ADULT - COMMENTS
Pt AA+Ox1-2 (name, "Weir" only), HOB elevated to 90°, son present. UE tremors visualized on self-feeding.
Pt nonverbal, somnolent, hypoaroused. HOB elevated to 90°. Briefly opened eyes to stim. PO trials of ice chips only. AMS & pt swallow profile place patient at risk for aspiration.

## 2019-05-03 NOTE — CONSULT NOTE ADULT - RS GEN PE MLT RESP DETAILS PC
no rhonchi/breath sounds equal/good air movement/no wheezes/no rales/clear to auscultation bilaterally

## 2019-05-03 NOTE — SWALLOW BEDSIDE ASSESSMENT ADULT - SLP PERTINENT HISTORY OF CURRENT PROBLEM
85 y/o male PMH CAD, CVA, CKD, BPH, Parkinson's, dementia, Afib on coumadin, HTN, CHF, PVD, HLD was brought in by ambulance from [facility] for shortness of breath. Reportedly, states that patient at baseline follows basic commands and is confused; FULL CODE.
83 y/o male PMH CAD, CVA, CKD, BPH, Parkinson's, dementia, Afib on coumadin, HTN, CHF, PVD, HLD was brought in by ambulance from [facility] for shortness of breath. Reportedly, states that patient at baseline follows basic commands and is confused; FULL CODE.

## 2019-05-03 NOTE — PROGRESS NOTE ADULT - PROBLEM SELECTOR PLAN 1
initially started on BIPAP in ED with improvement in hypoxia, transitioned to high flow nasal cannula 50% FiO2@50Lpm with O2 sat at 100%  CXR left upper lobe infiltrate  likely pneumonia in setting of fever, tachycardia, leukocytosis, recently treated on PO antibiotics 8 days ago as per NH  will continue with vancomycin, zosyn for now, treating for HAP  f/u blood cultures, strep antigen, urine legionella  f/u ID consult  continue to monitor respiratory status  patient still encephalopathic, likely secondary to sepsis  keep pt NPO for now  NSTEMI with intraventricular block - initial trop 0.1, f/u T2, T3 - avoid AV galina blocking agents P/w hypoxia, likely 2/2 pneumonia   - CXR showing opacity of left upper lobe  - Likely complicated pneumonia as patient is from NH  - Failed outpatient PO abx in NH  -   f/u blood cultures, strep antigen, legionella antigen  c/w vancomycin, zosyn  patient is FULL CODE as per son  Initially started on BIPAP in ED with improvement in hypoxia, transitioned to high flow nasal cannula 50% FiO2@50Lpm with O2 sat at 100%  CXR left upper lobe infiltrate  likely pneumonia in setting of fever, tachycardia, leukocytosis, recently treated on  will continue with vancomycin, zosyn for now, treating for HAP  f/u blood cultures, strep antigen, urine legionella  f/u ID consult  continue to monitor respiratory status  patient still encephalopathic, likely secondary to sepsis  NSTEMI with intraventricular block - initial trop 0.1, f/u T2, T3 - avoid AV galina blocking agents P/w fever 102.4, , leukocytosis 16, encephalopathy, and hypoxia  - CXR showing opacity of left upper lobe  - Likely complicated pneumonia as patient is from NH  - Failed outpatient PO abx in NH  - S/p vanc + cefepime + 2L NS bolus in ED  - C/w vanc + zosyn for complex pneumonia  - Originally on bipap, now on hi flow and saturating well  - Blood cx ngtd  - F/u strep and legionella  - ID consult Dr Velez

## 2019-05-03 NOTE — SWALLOW BEDSIDE ASSESSMENT ADULT - SPECIFY REASON(S)
Subjective assessment of current swallow function
Re-assessment of current swallow function; Overall alertness & mental status is improve

## 2019-05-03 NOTE — SWALLOW BEDSIDE ASSESSMENT ADULT - ADDITIONAL RECOMMENDATIONS
May consider alternative means of nutrition/hydration if in alignment with pt's/HCP's goals of care.
May consider alternative means of nutrition/hydration if in alignment with pt's/HCP's goals of care.

## 2019-05-03 NOTE — CONSULT NOTE ADULT - SUBJECTIVE AND OBJECTIVE BOX
CHIEF COMPLAINT:Patient is a 84y old  Male who presents with a chief complaint of shortness of breath (03 May 2019 11:48)      HPI:  84 year old male PMHX of  CAD-, CVA, CKD, BPH, Parkinson's, dementia, Afib on coumadin, HTN, CHF, PVD, HLD was brought in by ambulance from [facility] for shortness of breath. While in hospital for Lt sided pneumonia, pt found to have positive troponins but denies any chest pain.    PAST MEDICAL & SURGICAL HISTORY:  CAD (coronary artery disease)  Chronic kidney disease (CKD)  Cerebrovascular accident (CVA)  HLD (hyperlipidemia)  HTN (hypertension)  Chronic CHF  Dementia  Parkinsons  Afib: on coumadin        MEDICATIONS  (STANDING):  ALBUTerol/ipratropium for Nebulization 3 milliLiter(s) Nebulizer every 6 hours  atorvastatin 40 milliGRAM(s) Oral at bedtime  carbidopa/levodopa  25/100 1 Tablet(s) Oral daily  cholecalciferol 400 Unit(s) Oral daily  finasteride 5 milliGRAM(s) Oral daily  losartan 50 milliGRAM(s) Oral daily  piperacillin/tazobactam IVPB. 3.375 Gram(s) IV Intermittent every 8 hours  pyridoxine 50 milliGRAM(s) Oral daily  tamsulosin 0.4 milliGRAM(s) Oral at bedtime  vancomycin  IVPB 1000 milliGRAM(s) IV Intermittent every 24 hours    MEDICATIONS  (PRN):      FAMILY HISTORY: No hx of CAD      SOCIAL HISTORY:    [x ] Non-smoker    [x ] Alcohol-denies    Allergies    No Known Allergies    Intolerances    	    REVIEW OF SYSTEMS:  CONSTITUTIONAL: No fever, weight loss, or fatigue  EYES: No eye pain, visual disturbances, or discharge  ENT:  No difficulty hearing, tinnitus, vertigo; No sinus or throat pain  NECK: No pain or stiffness  RESPIRATORY: No cough, wheezing, chills or hemoptysis; + Shortness of Breath  CARDIOVASCULAR: No chest pain, palpitations, passing out, dizziness, or leg swelling  GASTROINTESTINAL: No abdominal or epigastric pain. No nausea, vomiting, or hematemesis; No diarrhea or constipation. No melena or hematochezia.  GENITOURINARY: No dysuria, frequency, hematuria, or incontinence  NEUROLOGICAL: No headaches, memory loss, loss of strength, numbness, or tremors  SKIN: No itching, burning, rashes, or lesions   LYMPH Nodes: No enlarged glands  ENDOCRINE: No heat or cold intolerance; No hair loss  MUSCULOSKELETAL: No joint pain or swelling; No muscle, back, or extremity pain  PSYCHIATRIC: No depression, anxiety, mood swings, or difficulty sleeping  HEME/LYMPH: No easy bruising, or bleeding gums  ALLERGY AND IMMUNOLOGIC: No hives or eczema	      PHYSICAL EXAM:  T(C): 36.8 (05-03-19 @ 10:15), Max: 36.8 (05-03-19 @ 10:15)  HR: 97 (05-03-19 @ 10:15) (83 - 117)  BP: 121/65 (05-03-19 @ 10:15) (111/65 - 132/80)  RR: 17 (05-03-19 @ 10:15) (17 - 26)  SpO2: 100% (05-03-19 @ 10:15) (95% - 100%)    Appearance: Normal	  HEENT:   Normal oral mucosa, PERRL, EOMI	  Lymphatic: No lymphadenopathy  Cardiovascular: Normal S1 S2, No JVD, No murmurs, No edema  [facility]  Psychiatry: A & O x 3, Mood & affect appropriate  Gastrointestinal:  Soft, Non-tender, + BS	  Skin: No rashes, No ecchymoses, No cyanosis	  Neurologic: Non-focal  Extremities: Normal range of motion, No clubbing, cyanosis or edema  Vascular: Peripheral pulses palpable 2+ bilaterally    	    ECG:  	afib with lbbb,pvc's    	  LABS:	 	    CARDIAC MARKERS:  CARDIAC MARKERS ( 02 May 2019 16:02 )  1.570 ng/mL / x     / x     / x     / x      CARDIAC MARKERS ( 02 May 2019 01:39 )  0.103 ng/mL / x     / x     / x     / x                              12.0   13.85 )-----------( 164      ( 03 May 2019 06:17 )             38.0     05-03    146<H>  |  114<H>  |  25<H>  ----------------------------<  81  3.8   |  25  |  1.36<H>    Ca    7.8<L>      03 May 2019 06:17  Phos  2.7     05-02  Mg     1.7     05-02    TPro  5.7<L>  /  Alb  1.9<L>  /  TBili  2.4<H>  /  DBili  x   /  AST  60<H>  /  ALT  46  /  AlkPhos  94  05-03    HgA1c: Hemoglobin A1C, Whole Blood: 6.1 % (05-02 @ 14:38)    PT/INR - ( 03 May 2019 10:33 )   PT: 132.0 sec;   INR: 11.04 ratio         PTT - ( 02 May 2019 02:46 )  PTT:59.6 sec  EXAM:  XR CHEST PORTABLE IMMED 1V                            PROCEDURE DATE:  05/02/2019          INTERPRETATION:  CLINICAL STATEMENT: Chest pain.    TECHNIQUE: AP view of the chest.    COMPARISON: None available.    FINDINGS/  IMPRESSION:  Nonspecific mild increased interstitial lung markings with consolidation   left midlung zone. Follow-up recommended.    Small left pleural effusion.    Heart size cannot be accurately assessed in this projection, but appear   enlarged.

## 2019-05-03 NOTE — PROGRESS NOTE ADULT - PROBLEM SELECTOR PLAN 10
IMPROVE VTE Individual Risk Assessment          RISK                                                          Points  [  ] Previous VTE                                                3  [  ] Thrombophilia                                             2  [  ] Lower limb paralysis                                   2        (unable to hold up >15 seconds)    [  ] Current Cancer                                             2         (within 6 months)  [ x ] Immobilization > 24 hrs                              1  [  ] ICU/CCU stay > 24 hours                             1  [ x ] Age > 60                                                         1    IMPROVE VTE Score: 2    no need for vte prophylaxis for now given supratherapeutic INR  f/u INR daily IMPROVE VTE Individual Risk Assessment          RISK                                                          Points  [  ] Previous VTE                                                3  [  ] Thrombophilia                                             2  [  ] Lower limb paralysis                                   2        (unable to hold up >15 seconds)    [  ] Current Cancer                                             2         (within 6 months)  [ x ] Immobilization > 24 hrs                              1  [  ] ICU/CCU stay > 24 hours                             1  [ x ] Age > 60                                                         1    IMPROVE VTE Score: 2    No need for vte prophylaxis for now given supratherapeutic INR  F/u INR daily

## 2019-05-03 NOTE — SWALLOW BEDSIDE ASSESSMENT ADULT - H & P REVIEW
yes/Pt known from prior eval; EMR, labs, imaging appreciated; seen for re-evaluation of swallow.
Consult received, EMR, labs, radiology reviewed./yes

## 2019-05-03 NOTE — PROGRESS NOTE ADULT - PROBLEM SELECTOR PLAN 7
holding antihypertensives in setting of possible sepsis 2/2 pneumonia  continue to monitor BP Holding cozaar in setting of sepsis 2/2 pneumonia  - Started lopressor 25 q8h for tachy + afib

## 2019-05-03 NOTE — SWALLOW BEDSIDE ASSESSMENT ADULT - SWALLOW EVAL: DIAGNOSIS
Pt p/w s&s oropharyngeal dysphagia, absent bolus manipulation of cold stimuli for > 60 seconds; absent & severely delayed trigger of swallow reflex for >40 seconds; Weak hyolaryngeal elevation, no cough elicited. Although No overt s/s aspiration on trials, Pt is not a candidate for PO feeding at this time due to high aspiration risk.
Pt p/w s&s oropharyngeal dysphagia c/b weak labial seal/pucker, reduced bolus formation, slow mastication, increased oral transit time, delayed swallow trigger, reduced hyolaryngeal elevation, & overt s&s of aspiration on solids& thin liqs

## 2019-05-03 NOTE — PROGRESS NOTE ADULT - PROBLEM SELECTOR PLAN 2
see plan as above  f/u blood cultures, strep antigen, legionella antigen  c/w vancomycin, zosyn  patient is FULL CODE as per son Elevated CE, peaked at 1.5, trending down  - EKG with intraventricular block  - Avoid AV galina blocking agents

## 2019-05-03 NOTE — SWALLOW BEDSIDE ASSESSMENT ADULT - DIET PRIOR TO ADMI
Unknown
As per son, Pt came from Rehab ([facility]) since Feb, and was on a dysphagia puree diet with nectar thick liquids.

## 2019-05-03 NOTE — SWALLOW BEDSIDE ASSESSMENT ADULT - ORAL PHASE
Decreased anterior-posterior movement of the bolus/Delayed oral transit time
Delayed oral transit time/Decreased anterior-posterior movement of the bolus/Lingual stasis/Stasis in anterior sulcus

## 2019-05-03 NOTE — PROGRESS NOTE ADULT - SUBJECTIVE AND OBJECTIVE BOX
Patient is a 84y old  Male who presents with a chief complaint of shortness of breath (02 May 2019 06:15)      INTERVAL HPI/OVERNIGHT EVENTS:  awake, confused, nad    VITAL SIGNS:  T(F): 97.9 (19 @ 05:43)  HR: 105 (19 @ 09:05)  BP: 117/75 (19 @ 05:43)  RR: 18 (19 @ 09:05)  SpO2: 100% (19 @ 09:05)  Wt(kg): --  I&O's Detail          REVIEW OF SYSTEMS: unreliable    CONSTITUTIONAL:  No fevers, chills, sweats    HEENT:  Eyes:  No diplopia or blurred vision. ENT:  No earache, sore throat or runny nose.    CARDIOVASCULAR:  No pressure, squeezing, tightness, or heaviness about the chest; no palpitations.    RESPIRATORY:  Per HPI    GASTROINTESTINAL:  No abdominal pain, nausea, vomiting or diarrhea.    GENITOURINARY:  No dysuria, frequency or urgency.    NEUROLOGIC:  No paresthesias, fasciculations, seizures or weakness.    PSYCHIATRIC:  No disorder of thought or mood.      PHYSICAL EXAM:    Constitutional:no complaints  ENMT:atnc  Neck:supple  Respiratory:clear  Cardiovascular:rr  Gastrointestinal:soft  Extremities:no edema  Vascular:intact  Neurological:non focal  Musculoskeletal:no edema, normal rom    MEDICATIONS  (STANDING):  ALBUTerol/ipratropium for Nebulization 3 milliLiter(s) Nebulizer every 6 hours  atorvastatin 40 milliGRAM(s) Oral at bedtime  carbidopa/levodopa  25/100 1 Tablet(s) Oral daily  cholecalciferol 400 Unit(s) Oral daily  finasteride 5 milliGRAM(s) Oral daily  losartan 50 milliGRAM(s) Oral daily  piperacillin/tazobactam IVPB. 3.375 Gram(s) IV Intermittent every 8 hours  pyridoxine 50 milliGRAM(s) Oral daily  sodium chloride 0.9%. 1000 milliLiter(s) (75 mL/Hr) IV Continuous <Continuous>  tamsulosin 0.4 milliGRAM(s) Oral at bedtime  vancomycin  IVPB 1000 milliGRAM(s) IV Intermittent every 24 hours    MEDICATIONS  (PRN):      Allergies    No Known Allergies        LABS:                        12.0   13.85 )-----------( 164      ( 03 May 2019 06:17 )             38.0     05-03    146<H>  |  114<H>  |  25<H>  ----------------------------<  81  3.8   |  25  |  1.36<H>    Ca    7.8<L>      03 May 2019 06:17  Phos  2.7     05-02  Mg     1.7     05-02    TPro  5.7<L>  /  Alb  1.9<L>  /  TBili  2.4<H>  /  DBili  x   /  AST  60<H>  /  ALT  46  /  AlkPhos  94  05-03    PT/INR - ( 02 May 2019 02:46 )   PT: 98.1 sec;   INR: 8.28 ratio         PTT - ( 02 May 2019 02:46 )  PTT:59.6 sec  Urinalysis Basic - ( 02 May 2019 02:46 )    Color: Yellow / Appearance: Clear / S.020 / pH: x  Gluc: x / Ketone: Trace  / Bili: Small / Urobili: 1   Blood: x / Protein: 30 mg/dL / Nitrite: Negative   Leuk Esterase: Trace / RBC: 2-5 /HPF / WBC 3-5 /HPF   Sq Epi: x / Non Sq Epi: x / Bacteria: x      ABG - ( 02 May 2019 06:01 )  pH, Arterial: 7.42  pH, Blood: x     /  pCO2: 38    /  pO2: 77    / HCO3: 24    / Base Excess: 0.4   /  SaO2: 94                CARDIAC MARKERS ( 02 May 2019 16:02 )  1.570 ng/mL / x     / x     / x     / x      CARDIAC MARKERS ( 02 May 2019 01:39 )  0.103 ng/mL / x     / x     / x     / x          CAPILLARY BLOOD GLUCOSE        pro-bnp [id number]  @ 01:39       RADIOLOGY & ADDITIONAL TESTS:    CXR:    EXAM:  XR CHEST PORTABLE IMMED 1V                            PROCEDURE DATE:  2019          INTERPRETATION:  CLINICAL STATEMENT: Chest pain.    TECHNIQUE: AP view of the chest.    COMPARISON: None available.    FINDINGS/  IMPRESSION:  Nonspecific mild increased interstitial lung markings with consolidation   left midlung zone. Follow-up recommended.    Small left pleural effusion.    Heart size cannot be accurately assessed in this projection, but appear   enlarged.

## 2019-05-03 NOTE — PROGRESS NOTE ADULT - PROBLEM SELECTOR PROBLEM 1
Hypoxia Pneumonia of left lung due to infectious organism, unspecified part of lung Sepsis due to pneumonia

## 2019-05-03 NOTE — PROGRESS NOTE ADULT - SUBJECTIVE AND OBJECTIVE BOX
PGY1 Note discussed with Supervising Resident and Primary Attending.    Patient is a 84y old  Male who presents with a chief complaint of shortness of breath (03 May 2019 09:22)      INTERVAL HPI/OVERNIGHT EVENTS :  No acute overnight events. Patient seen and examined at bedside. Patient has no current complaints. Patient denies nausea, vomiting, diarrhea, chest pain, SOB, headache.  MEDICATIONS  (STANDING):  ALBUTerol/ipratropium for Nebulization 3 milliLiter(s) Nebulizer every 6 hours  atorvastatin 40 milliGRAM(s) Oral at bedtime  carbidopa/levodopa  25/100 1 Tablet(s) Oral daily  cholecalciferol 400 Unit(s) Oral daily  finasteride 5 milliGRAM(s) Oral daily  losartan 50 milliGRAM(s) Oral daily  piperacillin/tazobactam IVPB. 3.375 Gram(s) IV Intermittent every 8 hours  pyridoxine 50 milliGRAM(s) Oral daily  tamsulosin 0.4 milliGRAM(s) Oral at bedtime  vancomycin  IVPB 1000 milliGRAM(s) IV Intermittent every 24 hours    MEDICATIONS  (PRN):      Allergies    No Known Allergies    Intolerances        REVIEW OF SYSTEMS :  * General: denies chills, fatigue  * Eyes: denies vision changes  * Respiratory: denies cough, SOB, wheezing  * Cardio: denies chest pain, palpitations  * GI: denies abd pain, nausea, vomiting, diarrhea  * : denies dysuria  * Neuro: denies headaches, numbness, weakness  * MSK: denies joint pain  * Skin: denies itching, rashes    Vital Signs Last 24 Hrs  T(C): 36.8 (03 May 2019 10:15), Max: 36.8 (03 May 2019 10:15)  T(F): 98.3 (03 May 2019 10:15), Max: 98.3 (03 May 2019 10:15)  HR: 97 (03 May 2019 10:15) (83 - 117)  BP: 121/65 (03 May 2019 10:15) (111/65 - 132/80)  BP(mean): --  RR: 17 (03 May 2019 10:15) (17 - 26)  SpO2: 100% (03 May 2019 10:15) (95% - 100%)    PHYSICAL EXAM :  * General: no acute distress, well-nourished, well-developed  * HEENT: atraumatic, normocephalic; moist mucus membranes; supple neck; no JVD  * CN: EOMI, PERRL  * Respiratory: cta b/l; no wheezes, rales, rhonchi  * Cardio: RRR; no appreciable murmurs, rubs, gallops  * Abd: soft, nontender, nondistended, +BS  * Neuro: AAOx3; strength 5/5; sensation intact throughout  * Extremities: no clubbing, cyanosis, edema  * Skin: no rashes    LABS:                          12.0   13.85 )-----------( 164      ( 03 May 2019 06:17 )             38.0     05-03    146<H>  |  114<H>  |  25<H>  ----------------------------<  81  3.8   |  25  |  1.36<H>    Ca    7.8<L>      03 May 2019 06:17  Phos  2.7     05-02  Mg     1.7     05-02    TPro  5.7<L>  /  Alb  1.9<L>  /  TBili  2.4<H>  /  DBili  x   /  AST  60<H>  /  ALT  46  /  AlkPhos  94  05-03    PT/INR - ( 03 May 2019 10:33 )   PT: 132.0 sec;   INR: 11.04 ratio         PTT - ( 02 May 2019 02:46 )  PTT:59.6 sec  Urinalysis Basic - ( 02 May 2019 02:46 )    Color: Yellow / Appearance: Clear / S.020 / pH: x  Gluc: x / Ketone: Trace  / Bili: Small / Urobili: 1   Blood: x / Protein: 30 mg/dL / Nitrite: Negative   Leuk Esterase: Trace / RBC: 2-5 /HPF / WBC 3-5 /HPF   Sq Epi: x / Non Sq Epi: x / Bacteria: x      CAPILLARY BLOOD GLUCOSE          RADIOLOGY & ADDITIONAL TESTS:   no new imaging    Imaging Personally Reviewed:    Consultant(s) Notes Reviewed: PGY1 Note discussed with Supervising Resident and Primary Attending.    Patient is a 84y old  Male who presents with a chief complaint of shortness of breath (03 May 2019 09:22)      INTERVAL HPI/OVERNIGHT EVENTS:  No acute overnight events. Patient seen and examined at bedside. Patient has no current complaints, says he is feeling better. Patient denies nausea, vomiting, diarrhea, chest pain, SOB, headache.     MEDICATIONS  (STANDING):  ALBUTerol/ipratropium for Nebulization 3 milliLiter(s) Nebulizer every 6 hours  atorvastatin 40 milliGRAM(s) Oral at bedtime  carbidopa/levodopa  25/100 1 Tablet(s) Oral daily  cholecalciferol 400 Unit(s) Oral daily  finasteride 5 milliGRAM(s) Oral daily  losartan 50 milliGRAM(s) Oral daily  piperacillin/tazobactam IVPB. 3.375 Gram(s) IV Intermittent every 8 hours  pyridoxine 50 milliGRAM(s) Oral daily  tamsulosin 0.4 milliGRAM(s) Oral at bedtime  vancomycin  IVPB 1000 milliGRAM(s) IV Intermittent every 24 hours    MEDICATIONS  (PRN):      Allergies    No Known Allergies    Intolerances        REVIEW OF SYSTEMS:  * General: denies chills, fatigue  * Eyes: denies vision changes  * Respiratory: denies cough, SOB, wheezing  * Cardio: denies chest pain, palpitations  * GI: denies abd pain, nausea, vomiting, diarrhea  * : denies dysuria  * Neuro: denies headaches, numbness, weakness  * MSK: denies joint pain  * Skin: denies itching, rashes    Vital Signs Last 24 Hrs  T(C): 36.8 (03 May 2019 10:15), Max: 36.8 (03 May 2019 10:15)  T(F): 98.3 (03 May 2019 10:15), Max: 98.3 (03 May 2019 10:15)  HR: 97 (03 May 2019 10:15) (83 - 117)  BP: 121/65 (03 May 2019 10:15) (111/65 - 132/80)  BP(mean): --  RR: 17 (03 May 2019 10:15) (17 - 26)  SpO2: 100% (03 May 2019 10:15) (95% - 100%)    PHYSICAL EXAM:  * General: no acute distress, well-nourished, well-developed  * HEENT: atraumatic, normocephalic; moist mucus membranes; supple neck  * CN: EOMI, PERRL  * Respiratory: diffuse crackles, L>R  * Cardio: irregular; no murmurs, rubs, gallops  * Abd: soft, nontender, nondistended, +BS  * Neuro: AAOx1-2; strength 5/5; sensation intact throughout  * Extremities: no clubbing, cyanosis, edema  * Skin: no rashes    LABS:                          12.0   13.85 )-----------( 164      ( 03 May 2019 06:17 )             38.0     05-03    146<H>  |  114<H>  |  25<H>  ----------------------------<  81  3.8   |  25  |  1.36<H>    Ca    7.8<L>      03 May 2019 06:17  Phos  2.7     05-02  Mg     1.7     05-02    TPro  5.7<L>  /  Alb  1.9<L>  /  TBili  2.4<H>  /  DBili  x   /  AST  60<H>  /  ALT  46  /  AlkPhos  94  05-03    PT/INR - ( 03 May 2019 10:33 )   PT: 132.0 sec;   INR: 11.04 ratio         PTT - ( 02 May 2019 02:46 )  PTT:59.6 sec  Urinalysis Basic - ( 02 May 2019 02:46 )    Color: Yellow / Appearance: Clear / S.020 / pH: x  Gluc: x / Ketone: Trace  / Bili: Small / Urobili: 1   Blood: x / Protein: 30 mg/dL / Nitrite: Negative   Leuk Esterase: Trace / RBC: 2-5 /HPF / WBC 3-5 /HPF   Sq Epi: x / Non Sq Epi: x / Bacteria: x      CAPILLARY BLOOD GLUCOSE          RADIOLOGY & ADDITIONAL TESTS:   no new imaging    Consultant(s) Notes Reviewed: yes

## 2019-05-03 NOTE — PROGRESS NOTE ADULT - PROBLEM SELECTOR PLAN 3
supratherapeutic INR on admission to 8.3  no evidence of bleeding  holding coumadin for now  f/u INR daily On coumadin 3 at home  - P/w supratherapeutic INR on admission to 8.3, now 11.04  - No evidence of bleeding  - Holding coumadin for now  - S/p vitamin K 2.5 PO  - Starting dig and lopressor q8h  - F/u INR daily  - Cardio Dr Henley

## 2019-05-03 NOTE — CONSULT NOTE ADULT - SUBJECTIVE AND OBJECTIVE BOX
HPI:  84 year old male PMH CAD, CVA, CKD, BPH, Parkinson's, dementia, Afib on coumadin, HTN, CHF, PVD, HLD was brought in by ambulance from [facility] for shortness of breath. . unable to obtain further history from pt 2/2 dementia. According to ED provider, son states that patient at baseline follows basic commands and is confused; FULL CODE. (02 May 2019 06:15)      PAST MEDICAL & SURGICAL HISTORY:  CAD (coronary artery disease): s/p cabg  Chronic kidney disease (CKD)  Cerebrovascular accident (CVA)  HLD (hyperlipidemia)  HTN (hypertension)  Chronic CHF  Dementia  Parkinsons  Afib: on coumadin  S/P CABG x 1      No Known Allergies      Meds:  ALBUTerol/ipratropium for Nebulization 3 milliLiter(s) Nebulizer every 6 hours  atorvastatin 40 milliGRAM(s) Oral at bedtime  carbidopa/levodopa  25/100 1 Tablet(s) Oral daily  cholecalciferol 400 Unit(s) Oral daily  finasteride 5 milliGRAM(s) Oral daily  metoprolol tartrate 25 milliGRAM(s) Oral every 8 hours  piperacillin/tazobactam IVPB. 3.375 Gram(s) IV Intermittent every 8 hours  pyridoxine 50 milliGRAM(s) Oral daily  tamsulosin 0.4 milliGRAM(s) Oral at bedtime  vancomycin  IVPB 1000 milliGRAM(s) IV Intermittent every 24 hours      SOCIAL HISTORY:  Smoker:  YES / NO        PACK YEARS:                         WHEN QUIT?  ETOH use:  YES / NO               FREQUENCY / QUANTITY:  Ilicit Drug use:  YES / NO  Occupation:  Assisted device use (Cane / Walker):  Live with:    FAMILY HISTORY:  Family history unknown      VITALS:  Vital Signs Last 24 Hrs  T(C): 36.6 (03 May 2019 14:38), Max: 36.8 (03 May 2019 10:15)  T(F): 97.9 (03 May 2019 14:38), Max: 98.3 (03 May 2019 10:15)  HR: 91 (03 May 2019 14:38) (91 - 117)  BP: 154/61 (03 May 2019 14:38) (113/70 - 154/61)  BP(mean): --  RR: 17 (03 May 2019 14:38) (17 - 22)  SpO2: 99% (03 May 2019 14:38) (99% - 100%)    LABS/DIAGNOSTIC TESTS:                          12.0   13.85 )-----------( 164      ( 03 May 2019 06:17 )             38.0     WBC Count: 13.85 K/uL ( @ 06:17)  WBC Count: 19.99 K/uL ( @ 09:55)  WBC Count: 16.13 K/uL ( @ 01:39)          146<H>  |  114<H>  |  25<H>  ----------------------------<  81  3.8   |  25  |  1.36<H>    Ca    7.8<L>      03 May 2019 06:17  Phos  2.7       Mg     1.7         TPro  5.7<L>  /  Alb  1.9<L>  /  TBili  2.4<H>  /  DBili  x   /  AST  60<H>  /  ALT  46  /  AlkPhos  94        Urinalysis Basic - ( 02 May 2019 02:46 )    Color: Yellow / Appearance: Clear / S.020 / pH: x  Gluc: x / Ketone: Trace  / Bili: Small / Urobili: 1   Blood: x / Protein: 30 mg/dL / Nitrite: Negative   Leuk Esterase: Trace / RBC: 2-5 /HPF / WBC 3-5 /HPF   Sq Epi: x / Non Sq Epi: x / Bacteria: x        LIVER FUNCTIONS - ( 03 May 2019 06:17 )  Alb: 1.9 g/dL / Pro: 5.7 g/dL / ALK PHOS: 94 U/L / ALT: 46 U/L DA / AST: 60 U/L / GGT: x             PT/INR - ( 03 May 2019 10:33 )   PT: 132.0 sec;   INR: 11.04 ratio         PTT - ( 02 May 2019 02:46 )  PTT:59.6 sec    LACTATE:    ABG - ABG - ( 02 May 2019 06:01 )  pH, Arterial: 7.42  pH, Blood: x     /  pCO2: 38    /  pO2: 77    / HCO3: 24    / Base Excess: 0.4   /  SaO2: 94                  CULTURES:   .Blood   @ 06:12   No growth to date.  --  --      .Urine  05-02 @ 06:09   <10,000 CFU/mL Normal Urogenital Seema  --  --          RADIOLOGY:< from: Xray Chest 1 View-PORTABLE IMMEDIATE (19 @ 01:38) >  EXAM:  XR CHEST PORTABLE IMMED 1V                            PROCEDURE DATE:  2019          INTERPRETATION:  CLINICAL STATEMENT: Chest pain.    TECHNIQUE: AP view of the chest.    COMPARISON: None available.    FINDINGS/  IMPRESSION:  Nonspecific mild increased interstitial lung markings with consolidation   left midlung zone. Follow-up recommended.    Small left pleural effusion.    Heart size cannot be accurately assessed in this projection, but appear   enlarged.        < end of copied text >        ROS  [  ] UNABLE TO ELICIT HPI:  84 year old male PMH CAD, CVA, CKD, BPH, Parkinson's, dementia, Afib on coumadin, HTN, CHF, PVD, HLD was brought in by ambulance from [facility] for shortness of breath. . unable to obtain further history from pt 2/2 dementia. According to ED provider, son states that patient at baseline follows basic commands and is confused; FULL CODE.     History as above, pt is actually answering questions appropriately but is a little confused just now, his son is at the bedside and giving some history. The pt was sent from rehab to the hospital for SOB and found to have a left  mid lung pneumonia. The son and the pt both state that he does cough after eating and drinking and so likely has aspiration pneumonia especially given his history of parkinson's. He has no fevers or chills, no vomiting or other complaints at this time, his SOB has improved after coming here.      PAST MEDICAL & SURGICAL HISTORY:  CAD (coronary artery disease): s/p cabg  Chronic kidney disease (CKD)  Cerebrovascular accident (CVA)  HLD (hyperlipidemia)  HTN (hypertension)  Chronic CHF  Dementia  Parkinsons  Afib: on coumadin  S/P CABG x 1      No Known Allergies      Meds:  ALBUTerol/ipratropium for Nebulization 3 milliLiter(s) Nebulizer every 6 hours  atorvastatin 40 milliGRAM(s) Oral at bedtime  carbidopa/levodopa  25/100 1 Tablet(s) Oral daily  cholecalciferol 400 Unit(s) Oral daily  finasteride 5 milliGRAM(s) Oral daily  metoprolol tartrate 25 milliGRAM(s) Oral every 8 hours  piperacillin/tazobactam IVPB. 3.375 Gram(s) IV Intermittent every 8 hours  pyridoxine 50 milliGRAM(s) Oral daily  tamsulosin 0.4 milliGRAM(s) Oral at bedtime  vancomycin  IVPB 1000 milliGRAM(s) IV Intermittent every 24 hours      SOCIAL HISTORY:  Smoker:  no  ETOH use:  no    FAMILY HISTORY:  Family history unknown      VITALS:  Vital Signs Last 24 Hrs  T(C): 36.6 (03 May 2019 14:38), Max: 36.8 (03 May 2019 10:15)  T(F): 97.9 (03 May 2019 14:38), Max: 98.3 (03 May 2019 10:15)  HR: 91 (03 May 2019 14:38) (91 - 117)  BP: 154/61 (03 May 2019 14:38) (113/70 - 154/61)  BP(mean): --  RR: 17 (03 May 2019 14:38) (17 - 22)  SpO2: 99% (03 May 2019 14:38) (99% - 100%)    LABS/DIAGNOSTIC TESTS:                          12.0   13.85 )-----------( 164      ( 03 May 2019 06:17 )             38.0     WBC Count: 13.85 K/uL ( @ 06:17)  WBC Count: 19.99 K/uL ( @ 09:55)  WBC Count: 16.13 K/uL ( @ 01:39)          146<H>  |  114<H>  |  25<H>  ----------------------------<  81  3.8   |  25  |  1.36<H>    Ca    7.8<L>      03 May 2019 06:17  Phos  2.7     05-  Mg     1.7     05-    TPro  5.7<L>  /  Alb  1.9<L>  /  TBili  2.4<H>  /  DBili  x   /  AST  60<H>  /  ALT  46  /  AlkPhos  94  05-03      Urinalysis Basic - ( 02 May 2019 02:46 )    Color: Yellow / Appearance: Clear / S.020 / pH: x  Gluc: x / Ketone: Trace  / Bili: Small / Urobili: 1   Blood: x / Protein: 30 mg/dL / Nitrite: Negative   Leuk Esterase: Trace / RBC: 2-5 /HPF / WBC 3-5 /HPF   Sq Epi: x / Non Sq Epi: x / Bacteria: x        LIVER FUNCTIONS - ( 03 May 2019 06:17 )  Alb: 1.9 g/dL / Pro: 5.7 g/dL / ALK PHOS: 94 U/L / ALT: 46 U/L DA / AST: 60 U/L / GGT: x             PT/INR - ( 03 May 2019 10:33 )   PT: 132.0 sec;   INR: 11.04 ratio         PTT - ( 02 May 2019 02:46 )  PTT:59.6 sec    LACTATE:    ABG - ABG - ( 02 May 2019 06:01 )  pH, Arterial: 7.42  pH, Blood: x     /  pCO2: 38    /  pO2: 77    / HCO3: 24    / Base Excess: 0.4   /  SaO2: 94                  CULTURES:   .Blood   @ 06:12   No growth to date.  --  --      .Urine   @ 06:09   <10,000 CFU/mL Normal Urogenital Seema  --  --          RADIOLOGY:< from: Xray Chest 1 View-PORTABLE IMMEDIATE (19 @ 01:38) >  EXAM:  XR CHEST PORTABLE IMMED 1V                            PROCEDURE DATE:  2019          INTERPRETATION:  CLINICAL STATEMENT: Chest pain.    TECHNIQUE: AP view of the chest.    COMPARISON: None available.    FINDINGS/  IMPRESSION:  Nonspecific mild increased interstitial lung markings with consolidation   left midlung zone. Follow-up recommended.    Small left pleural effusion.    Heart size cannot be accurately assessed in this projection, but appear   enlarged.        < end of copied text >        ROS  [  ] UNABLE TO ELICIT

## 2019-05-03 NOTE — PROGRESS NOTE ADULT - PROBLEM SELECTOR PLAN 9
Cr 1.36 on admission, approximately around baseline  continue to monitor BP  renally dose all medications Cr 1.36 on admission, approximately around baseline  - Renally dose all meds  - Avoid nephrotoxic meds  - Monitor BMP

## 2019-05-04 LAB
ANION GAP SERPL CALC-SCNC: 8 MMOL/L — SIGNIFICANT CHANGE UP (ref 5–17)
BASOPHILS # BLD AUTO: 0.03 K/UL — SIGNIFICANT CHANGE UP (ref 0–0.2)
BASOPHILS NFR BLD AUTO: 0.3 % — SIGNIFICANT CHANGE UP (ref 0–2)
BUN SERPL-MCNC: 28 MG/DL — HIGH (ref 7–18)
CALCIUM SERPL-MCNC: 7.8 MG/DL — LOW (ref 8.4–10.5)
CHLORIDE SERPL-SCNC: 111 MMOL/L — HIGH (ref 96–108)
CO2 SERPL-SCNC: 23 MMOL/L — SIGNIFICANT CHANGE UP (ref 22–31)
CREAT SERPL-MCNC: 1.28 MG/DL — SIGNIFICANT CHANGE UP (ref 0.5–1.3)
EOSINOPHIL # BLD AUTO: 0.1 K/UL — SIGNIFICANT CHANGE UP (ref 0–0.5)
EOSINOPHIL NFR BLD AUTO: 1 % — SIGNIFICANT CHANGE UP (ref 0–6)
GLUCOSE SERPL-MCNC: 80 MG/DL — SIGNIFICANT CHANGE UP (ref 70–99)
HCT VFR BLD CALC: 34.4 % — LOW (ref 39–50)
HGB BLD-MCNC: 11.4 G/DL — LOW (ref 13–17)
IMM GRANULOCYTES NFR BLD AUTO: 0.3 % — SIGNIFICANT CHANGE UP (ref 0–1.5)
INR BLD: 3.59 RATIO — HIGH (ref 0.88–1.16)
LYMPHOCYTES # BLD AUTO: 0.74 K/UL — LOW (ref 1–3.3)
LYMPHOCYTES # BLD AUTO: 7.8 % — LOW (ref 13–44)
MCHC RBC-ENTMCNC: 28.4 PG — SIGNIFICANT CHANGE UP (ref 27–34)
MCHC RBC-ENTMCNC: 33.1 GM/DL — SIGNIFICANT CHANGE UP (ref 32–36)
MCV RBC AUTO: 85.8 FL — SIGNIFICANT CHANGE UP (ref 80–100)
MONOCYTES # BLD AUTO: 0.65 K/UL — SIGNIFICANT CHANGE UP (ref 0–0.9)
MONOCYTES NFR BLD AUTO: 6.8 % — SIGNIFICANT CHANGE UP (ref 2–14)
NEUTROPHILS # BLD AUTO: 7.99 K/UL — HIGH (ref 1.8–7.4)
NEUTROPHILS NFR BLD AUTO: 83.8 % — HIGH (ref 43–77)
NRBC # BLD: 0 /100 WBCS — SIGNIFICANT CHANGE UP (ref 0–0)
PLATELET # BLD AUTO: 161 K/UL — SIGNIFICANT CHANGE UP (ref 150–400)
POTASSIUM SERPL-MCNC: 3.7 MMOL/L — SIGNIFICANT CHANGE UP (ref 3.5–5.3)
POTASSIUM SERPL-SCNC: 3.7 MMOL/L — SIGNIFICANT CHANGE UP (ref 3.5–5.3)
PROTHROM AB SERPL-ACNC: 41.4 SEC — HIGH (ref 10–12.9)
RBC # BLD: 4.01 M/UL — LOW (ref 4.2–5.8)
RBC # FLD: 18.3 % — HIGH (ref 10.3–14.5)
SODIUM SERPL-SCNC: 142 MMOL/L — SIGNIFICANT CHANGE UP (ref 135–145)
WBC # BLD: 9.54 K/UL — SIGNIFICANT CHANGE UP (ref 3.8–10.5)
WBC # FLD AUTO: 9.54 K/UL — SIGNIFICANT CHANGE UP (ref 3.8–10.5)

## 2019-05-04 RX ORDER — ASPIRIN/CALCIUM CARB/MAGNESIUM 324 MG
81 TABLET ORAL DAILY
Qty: 0 | Refills: 0 | Status: DISCONTINUED | OUTPATIENT
Start: 2019-05-04 | End: 2019-05-09

## 2019-05-04 RX ORDER — FUROSEMIDE 40 MG
20 TABLET ORAL DAILY
Qty: 0 | Refills: 0 | Status: DISCONTINUED | OUTPATIENT
Start: 2019-05-04 | End: 2019-05-09

## 2019-05-04 RX ORDER — DIGOXIN 250 MCG
0.12 TABLET ORAL DAILY
Qty: 0 | Refills: 0 | Status: DISCONTINUED | OUTPATIENT
Start: 2019-05-04 | End: 2019-05-05

## 2019-05-04 RX ORDER — SPIRONOLACTONE 25 MG/1
25 TABLET, FILM COATED ORAL DAILY
Qty: 0 | Refills: 0 | Status: DISCONTINUED | OUTPATIENT
Start: 2019-05-04 | End: 2019-05-09

## 2019-05-04 RX ORDER — WARFARIN SODIUM 2.5 MG/1
2 TABLET ORAL ONCE
Qty: 0 | Refills: 0 | Status: COMPLETED | OUTPATIENT
Start: 2019-05-04 | End: 2019-05-04

## 2019-05-04 RX ADMIN — Medication 3 MILLILITER(S): at 14:33

## 2019-05-04 RX ADMIN — Medication 25 MILLIGRAM(S): at 22:28

## 2019-05-04 RX ADMIN — Medication 25 MILLIGRAM(S): at 05:38

## 2019-05-04 RX ADMIN — PIPERACILLIN AND TAZOBACTAM 25 GRAM(S): 4; .5 INJECTION, POWDER, LYOPHILIZED, FOR SOLUTION INTRAVENOUS at 05:37

## 2019-05-04 RX ADMIN — Medication 20 MILLIGRAM(S): at 12:53

## 2019-05-04 RX ADMIN — TAMSULOSIN HYDROCHLORIDE 0.4 MILLIGRAM(S): 0.4 CAPSULE ORAL at 22:28

## 2019-05-04 RX ADMIN — Medication 25 MILLIGRAM(S): at 13:06

## 2019-05-04 RX ADMIN — Medication 50 MILLIGRAM(S): at 12:54

## 2019-05-04 RX ADMIN — ATORVASTATIN CALCIUM 40 MILLIGRAM(S): 80 TABLET, FILM COATED ORAL at 22:28

## 2019-05-04 RX ADMIN — Medication 3 MILLILITER(S): at 20:54

## 2019-05-04 RX ADMIN — Medication 81 MILLIGRAM(S): at 12:56

## 2019-05-04 RX ADMIN — Medication 3 MILLILITER(S): at 09:07

## 2019-05-04 RX ADMIN — Medication 3 MILLILITER(S): at 04:12

## 2019-05-04 RX ADMIN — FINASTERIDE 5 MILLIGRAM(S): 5 TABLET, FILM COATED ORAL at 12:54

## 2019-05-04 RX ADMIN — PIPERACILLIN AND TAZOBACTAM 25 GRAM(S): 4; .5 INJECTION, POWDER, LYOPHILIZED, FOR SOLUTION INTRAVENOUS at 22:28

## 2019-05-04 RX ADMIN — PIPERACILLIN AND TAZOBACTAM 25 GRAM(S): 4; .5 INJECTION, POWDER, LYOPHILIZED, FOR SOLUTION INTRAVENOUS at 13:04

## 2019-05-04 RX ADMIN — WARFARIN SODIUM 2 MILLIGRAM(S): 2.5 TABLET ORAL at 22:28

## 2019-05-04 RX ADMIN — Medication 400 UNIT(S): at 12:55

## 2019-05-04 RX ADMIN — Medication 0.12 MILLIGRAM(S): at 12:53

## 2019-05-04 RX ADMIN — CARBIDOPA AND LEVODOPA 1 TABLET(S): 25; 100 TABLET ORAL at 12:54

## 2019-05-04 NOTE — DIETITIAN INITIAL EVALUATION ADULT. - PERTINENT LABORATORY DATA
05-04 Na142 mmol/L Glu 80 mg/dL K+ 3.7 mmol/L Cr  1.28 mg/dL BUN 28 mg/dL<H> 05-02 Phos 2.7 mg/dL 05-03 Alb 1.9 g/dL<L> 05-02 [id number] 6.1 %<H> 05-02 Chol <50 mg/dL LDL <17 mg/dL HDL 27 mg/dL<L> Trig 30 mg/dL

## 2019-05-04 NOTE — DIETITIAN INITIAL EVALUATION ADULT. - NUTRITION INTERVENTION
Meals and Snack/Medical Food Supplements/Vitamin/Collaboration and Referral of Nutrition Care/Feeding Assistance

## 2019-05-04 NOTE — DIETITIAN INITIAL EVALUATION ADULT. - PROBLEM SELECTOR PLAN 1
initially started on BIPAP in ED with improvement in hypoxia, transitioned to high flow nasal cannula 50% FiO2@50Lpm with O2 sat at 100%  CXR left upper lobe infiltrate  likely pneumonia in setting of fever, tachycardia, leukocytosis, recently treated on PO antibiotics 8 days ago as per NH  will continue with vancomycin, zosyn for now, treating for HAP  f/u blood cultures, strep antigen, urine legionella  f/u ID consult  continue to monitor respiratory status  patient still encephalopathic, likely secondary to sepsis  keep pt NPO for now  NSTEMI with intraventricular block - initial trop 0.1, f/u T2, T3 - avoid AV galina blocking agents

## 2019-05-04 NOTE — PROGRESS NOTE ADULT - PROBLEM SELECTOR PLAN 9
Cr 1.36 on admission, approximately around baseline  - Renally dose all meds  - Avoid nephrotoxic meds  - Monitor BMP

## 2019-05-04 NOTE — PROGRESS NOTE ADULT - SUBJECTIVE AND OBJECTIVE BOX
PGY1 Note discussed with Supervising Resident and Primary Attending.    Patient is a 84y old  Male who presents with a chief complaint of shortness of breath (04 May 2019 11:14)      INTERVAL HPI/OVERNIGHT EVENTS :  No acute overnight events. Patient seen and examined at bedside. Patient has no current complaints. Patient denies nausea, vomiting, diarrhea, chest pain, SOB, headache.  MEDICATIONS  (STANDING):  ALBUTerol/ipratropium for Nebulization 3 milliLiter(s) Nebulizer every 6 hours  aspirin  chewable 81 milliGRAM(s) Oral daily  atorvastatin 40 milliGRAM(s) Oral at bedtime  carbidopa/levodopa  25/100 1 Tablet(s) Oral daily  cholecalciferol 400 Unit(s) Oral daily  digoxin     Tablet 0.125 milliGRAM(s) Oral daily  finasteride 5 milliGRAM(s) Oral daily  furosemide   Injectable 20 milliGRAM(s) IV Push daily  metoprolol tartrate 25 milliGRAM(s) Oral every 8 hours  piperacillin/tazobactam IVPB. 3.375 Gram(s) IV Intermittent every 8 hours  pyridoxine 50 milliGRAM(s) Oral daily  spironolactone 25 milliGRAM(s) Oral daily  tamsulosin 0.4 milliGRAM(s) Oral at bedtime  warfarin 2 milliGRAM(s) Oral once    MEDICATIONS  (PRN):      Allergies    No Known Allergies    Intolerances        REVIEW OF SYSTEMS :  * General: denies chills, fatigue  * Eyes: denies vision changes  * Respiratory: denies cough, SOB, wheezing  * Cardio: denies chest pain, palpitations  * GI: denies abd pain, nausea, vomiting, diarrhea  * : denies dysuria  * Neuro: denies headaches, numbness, weakness  * MSK: denies joint pain  * Skin: denies itching, rashes    Vital Signs Last 24 Hrs  T(C): 36.5 (04 May 2019 05:06), Max: 36.7 (03 May 2019 21:15)  T(F): 97.7 (04 May 2019 05:06), Max: 98.1 (03 May 2019 21:15)  HR: 94 (04 May 2019 09:38) (85 - 117)  BP: 118/65 (04 May 2019 05:06) (118/65 - 154/61)  BP(mean): --  RR: 17 (04 May 2019 05:06) (17 - 18)  SpO2: 99% (04 May 2019 09:38) (96% - 99%)    PHYSICAL EXAM :  * General: no acute distress, well-nourished, well-developed  * HEENT: atraumatic, normocephalic; moist mucus membranes; supple neck; no JVD  * CN: EOMI, PERRL  * Respiratory: cta b/l; no wheezes, rales, rhonchi  * Cardio: RRR; no appreciable murmurs, rubs, gallops  * Abd: soft, nontender, nondistended, +BS  * Neuro: AAOx3; strength 5/5; sensation intact throughout  * Extremities: no clubbing, cyanosis, edema  * Skin: no rashes    LABS:                          11.4   9.54  )-----------( 161      ( 04 May 2019 07:25 )             34.4     05-04    142  |  111<H>  |  28<H>  ----------------------------<  80  3.7   |  23  |  1.28    Ca    7.8<L>      04 May 2019 07:25    TPro  5.7<L>  /  Alb  1.9<L>  /  TBili  2.4<H>  /  DBili  x   /  AST  60<H>  /  ALT  46  /  AlkPhos  94  05-03    PT/INR - ( 04 May 2019 07:25 )   PT: 41.4 sec;   INR: 3.59 ratio             CAPILLARY BLOOD GLUCOSE          RADIOLOGY & ADDITIONAL TESTS:   no new imaging    Imaging Personally Reviewed:    Consultant(s) Notes Reviewed: cov dr mathew  PGY1 Note discussed with Supervising Resident and Primary Attending.    Patient is a 84y old  Male who presents with a chief complaint of shortness of breath (04 May 2019 11:14)      INTERVAL HPI/OVERNIGHT EVENTS :  No acute overnight events. Patient seen and examined at bedside. Patient has no current complaints. Patient denies nausea, vomiting, diarrhea, chest pain, SOB, headache.  MEDICATIONS  (STANDING):  ALBUTerol/ipratropium for Nebulization 3 milliLiter(s) Nebulizer every 6 hours  aspirin  chewable 81 milliGRAM(s) Oral daily  atorvastatin 40 milliGRAM(s) Oral at bedtime  carbidopa/levodopa  25/100 1 Tablet(s) Oral daily  cholecalciferol 400 Unit(s) Oral daily  digoxin     Tablet 0.125 milliGRAM(s) Oral daily  finasteride 5 milliGRAM(s) Oral daily  furosemide   Injectable 20 milliGRAM(s) IV Push daily  metoprolol tartrate 25 milliGRAM(s) Oral every 8 hours  piperacillin/tazobactam IVPB. 3.375 Gram(s) IV Intermittent every 8 hours  pyridoxine 50 milliGRAM(s) Oral daily  spironolactone 25 milliGRAM(s) Oral daily  tamsulosin 0.4 milliGRAM(s) Oral at bedtime  warfarin 2 milliGRAM(s) Oral once    MEDICATIONS  (PRN):      Allergies    No Known Allergies    Intolerances        REVIEW OF SYSTEMS :  * General: denies chills, fatigue  * Eyes: denies vision changes  * Respiratory: denies cough, SOB, wheezing  * Cardio: denies chest pain, palpitations  * GI: denies abd pain, nausea, vomiting, diarrhea  * : denies dysuria  * Neuro: denies headaches, numbness, weakness  * MSK: denies joint pain  * Skin: denies itching, rashes    Vital Signs Last 24 Hrs  T(C): 36.5 (04 May 2019 05:06), Max: 36.7 (03 May 2019 21:15)  T(F): 97.7 (04 May 2019 05:06), Max: 98.1 (03 May 2019 21:15)  HR: 94 (04 May 2019 09:38) (85 - 117)  BP: 118/65 (04 May 2019 05:06) (118/65 - 154/61)  BP(mean): --  RR: 17 (04 May 2019 05:06) (17 - 18)  SpO2: 99% (04 May 2019 09:38) (96% - 99%)    PHYSICAL EXAM :  * General: no acute distress, well-nourished, well-developed  * HEENT: atraumatic, normocephalic; moist mucus membranes; supple neck; no JVD  * CN: EOMI, PERRL  * Respiratory: cta b/l; no wheezes, rales, rhonchi  * Cardio: RRR; no appreciable murmurs, rubs, gallops  * Abd: soft, nontender, nondistended, +BS  * Neuro: AAOx3; strength 5/5; sensation intact throughout  * Extremities: no clubbing, cyanosis, edema  * Skin: no rashes    LABS:                          11.4   9.54  )-----------( 161      ( 04 May 2019 07:25 )             34.4     05-04    142  |  111<H>  |  28<H>  ----------------------------<  80  3.7   |  23  |  1.28    Ca    7.8<L>      04 May 2019 07:25    TPro  5.7<L>  /  Alb  1.9<L>  /  TBili  2.4<H>  /  DBili  x   /  AST  60<H>  /  ALT  46  /  AlkPhos  94  05-03    PT/INR - ( 04 May 2019 07:25 )   PT: 41.4 sec;   INR: 3.59 ratio             CAPILLARY BLOOD GLUCOSE          RADIOLOGY & ADDITIONAL TESTS:   no new imaging    Imaging Personally Reviewed:    Consultant(s) Notes Reviewed: cov dr mathew  PGY1 Note discussed with Supervising Resident and Primary Attending.    Patient is a 84y old  Male who presents with a chief complaint of shortness of breath (04 May 2019 11:14)      INTERVAL HPI/OVERNIGHT EVENTS:  No acute overnight events. Patient seen and examined at bedside. Patient has no current complaints. Patient denies nausea, vomiting, diarrhea, chest pain, SOB, headache.    MEDICATIONS  (STANDING):  ALBUTerol/ipratropium for Nebulization 3 milliLiter(s) Nebulizer every 6 hours  aspirin  chewable 81 milliGRAM(s) Oral daily  atorvastatin 40 milliGRAM(s) Oral at bedtime  carbidopa/levodopa  25/100 1 Tablet(s) Oral daily  cholecalciferol 400 Unit(s) Oral daily  digoxin     Tablet 0.125 milliGRAM(s) Oral daily  finasteride 5 milliGRAM(s) Oral daily  furosemide   Injectable 20 milliGRAM(s) IV Push daily  metoprolol tartrate 25 milliGRAM(s) Oral every 8 hours  piperacillin/tazobactam IVPB. 3.375 Gram(s) IV Intermittent every 8 hours  pyridoxine 50 milliGRAM(s) Oral daily  spironolactone 25 milliGRAM(s) Oral daily  tamsulosin 0.4 milliGRAM(s) Oral at bedtime  warfarin 2 milliGRAM(s) Oral once    MEDICATIONS  (PRN):      Allergies    No Known Allergies    Intolerances        REVIEW OF SYSTEMS:  * General: denies chills, fatigue  * Eyes: denies vision changes  * Respiratory: denies cough, SOB, wheezing  * Cardio: denies chest pain, palpitations  * GI: denies abd pain, nausea, vomiting, diarrhea  * : denies dysuria  * Neuro: denies headaches, numbness, weakness  * MSK: denies joint pain  * Skin: denies itching, rashes    Vital Signs Last 24 Hrs  T(C): 36.5 (04 May 2019 05:06), Max: 36.7 (03 May 2019 21:15)  T(F): 97.7 (04 May 2019 05:06), Max: 98.1 (03 May 2019 21:15)  HR: 94 (04 May 2019 09:38) (85 - 117)  BP: 118/65 (04 May 2019 05:06) (118/65 - 154/61)  BP(mean): --  RR: 17 (04 May 2019 05:06) (17 - 18)  SpO2: 99% (04 May 2019 09:38) (96% - 99%)    PHYSICAL EXAM:  * General: no acute distress, well-nourished, well-developed  * HEENT: atraumatic, normocephalic; moist mucus membranes; supple neck; no JVD  * CN: EOMI, PERRL  * Respiratory: L sided crackles; no wheezing or rhonchi; saturating well on hi flow  * Cardio: RRR; no appreciable murmurs, rubs, gallops  * Abd: soft, nontender, nondistended, +BS  * Neuro: AAOx1-2  * Extremities: no clubbing, cyanosis, edema  * Skin: no rashes    LABS:                          11.4   9.54  )-----------( 161      ( 04 May 2019 07:25 )             34.4     05-04    142  |  111<H>  |  28<H>  ----------------------------<  80  3.7   |  23  |  1.28    Ca    7.8<L>      04 May 2019 07:25    TPro  5.7<L>  /  Alb  1.9<L>  /  TBili  2.4<H>  /  DBili  x   /  AST  60<H>  /  ALT  46  /  AlkPhos  94  05-03    PT/INR - ( 04 May 2019 07:25 )   PT: 41.4 sec;   INR: 3.59 ratio             CAPILLARY BLOOD GLUCOSE          RADIOLOGY & ADDITIONAL TESTS:   no new imaging    Consultant(s) Notes Reviewed: yes

## 2019-05-04 NOTE — PROGRESS NOTE ADULT - SUBJECTIVE AND OBJECTIVE BOX
CHIEF COMPLAINT:Patient is a 84y old  Male who presents with a chief complaint of shortness of breath.pt still on high flow O2.    	  REVIEW OF SYSTEMS:  CONSTITUTIONAL: No fever, weight loss, or fatigue  EYES: No eye pain, visual disturbances, or discharge  ENT:  No difficulty hearing, tinnitus, vertigo; No sinus or throat pain  NECK: No pain or stiffness  RESPIRATORY: No cough, wheezing, chills or hemoptysis; No Shortness of Breath  CARDIOVASCULAR: No chest pain, palpitations, passing out, dizziness, or leg swelling  GASTROINTESTINAL: No abdominal or epigastric pain. No nausea, vomiting, or hematemesis; No diarrhea or constipation. No melena or hematochezia.  GENITOURINARY: No dysuria, frequency, hematuria, or incontinence  NEUROLOGICAL: No headaches, memory loss, loss of strength, numbness, or tremors  SKIN: No itching, burning, rashes, or lesions   LYMPH Nodes: No enlarged glands  ENDOCRINE: No heat or cold intolerance; No hair loss  MUSCULOSKELETAL: No joint pain or swelling; No muscle, back, or extremity pain  PSYCHIATRIC: No depression, anxiety, mood swings, or difficulty sleeping  HEME/LYMPH: No easy bruising, or bleeding gums  ALLERGY AND IMMUNOLOGIC: No hives or eczema	      PHYSICAL EXAM:  T(C): 36.5 (05-04-19 @ 05:06), Max: 36.7 (05-03-19 @ 21:15)  HR: 94 (05-04-19 @ 09:38) (85 - 117)  BP: 118/65 (05-04-19 @ 05:06) (113/70 - 154/61)  RR: 17 (05-04-19 @ 05:06) (17 - 18)  SpO2: 99% (05-04-19 @ 09:38) (96% - 100%)  Wt(kg): --  I&O's Summary    03 May 2019 07:01  -  04 May 2019 07:00  --------------------------------------------------------  IN: 0 mL / OUT: 1550 mL / NET: -1550 mL        Appearance: Normal	  HEENT:   Normal oral mucosa, PERRL, EOMI	  Lymphatic: No lymphadenopathy  Cardiovascular: Normal S1 S2,+ JVD  Respiratory: Dec bS at bases  Psychiatry: A & O x 3, Mood & affect appropriate  Gastrointestinal:  Soft, Non-tender, + BS	  Skin: No rashes, No ecchymoses, No cyanosis	  Neurologic: Non-focal  Extremities: Normal range of motion, No clubbing, cyanosis or edema  Vascular: Peripheral pulses palpable 2+ bilaterally    MEDICATIONS  (STANDING):  ALBUTerol/ipratropium for Nebulization 3 milliLiter(s) Nebulizer every 6 hours  aspirin  chewable 81 milliGRAM(s) Oral daily  atorvastatin 40 milliGRAM(s) Oral at bedtime  carbidopa/levodopa  25/100 1 Tablet(s) Oral daily  cholecalciferol 400 Unit(s) Oral daily  digoxin     Tablet 0.125 milliGRAM(s) Oral daily  finasteride 5 milliGRAM(s) Oral daily  furosemide   Injectable 20 milliGRAM(s) IV Push daily  metoprolol tartrate 25 milliGRAM(s) Oral every 8 hours  piperacillin/tazobactam IVPB. 3.375 Gram(s) IV Intermittent every 8 hours  pyridoxine 50 milliGRAM(s) Oral daily  spironolactone 25 milliGRAM(s) Oral daily  tamsulosin 0.4 milliGRAM(s) Oral at bedtime  warfarin 2 milliGRAM(s) Oral once      TELEMETRY: 	afib with lbbb  	  	  LABS:	 	    CARDIAC MARKERS:  CARDIAC MARKERS ( 03 May 2019 14:04 )  0.717 ng/mL / x     / 154 U/L / x     / 8.5 ng/mL  CARDIAC MARKERS ( 02 May 2019 16:02 )  1.570 ng/mL / x     / x     / x     / x                                    11.4   9.54  )-----------( 161      ( 04 May 2019 07:25 )             34.4     05-04    142  |  111<H>  |  28<H>  ----------------------------<  80  3.7   |  23  |  1.28    Ca    7.8<L>      04 May 2019 07:25    TPro  5.7<L>  /  Alb  1.9<L>  /  TBili  2.4<H>  /  DBili  x   /  AST  60<H>  /  ALT  46  /  AlkPhos  94  05-03    proBNP: Serum Pro-Brain Natriuretic Peptide: 5858 pg/mL (05-02 @ 01:39)    Lipid Profile: Cholesterol <50  LDL <17  HDL 27  TG 30    HgA1c: Hemoglobin A1C, Whole Blood: 6.1 % (05-02 @ 14:38)    TSH: Thyroid Stimulating Hormone, Serum: 0.21 uU/mL (05-02 @ 09:55)      	    EXAM:  XR CHEST PORTABLE IMMED 1V                            PROCEDURE DATE:  05/02/2019          INTERPRETATION:  CLINICAL STATEMENT: Chest pain.    TECHNIQUE: AP view of the chest.    COMPARISON: None available.    FINDINGS/  IMPRESSION:  Nonspecific mild increased interstitial lung markings with consolidation   left midlung zone. Follow-up recommended.    Small left pleural effusion.    Heart size cannot be accurately assessed in this projection, but appear   enlarged.      PT/INR - ( 04 May 2019 07:25 )   PT: 41.4 sec;   INR: 3.59 ratio

## 2019-05-04 NOTE — PROGRESS NOTE ADULT - ASSESSMENT
84 year old male PMH CAD, CVA, CKD, BPH, Parkinson's, dementia, Afib on coumadin, HTN, CHF, PVD, HLD was brought in by ambulance from [facility] for shortness of breath,Lt sided pneumonia and + troponis due to demand ischemia-Type II MI.  1.Tele monitoring.  2.ABX.  3.Echocardiogram.  4.Afib-dig .125mg qd, lopressor 25mg q8h,resume coumadin.  5.CHF-IV lasix and aldactone.  6.CAD-asa,b blocker,statin.  7.CVA-coumadin,statin.  8.BPH-flomax,proscar.

## 2019-05-04 NOTE — PROGRESS NOTE ADULT - PROBLEM SELECTOR PLAN 1
P/w fever 102.4, , leukocytosis 16, encephalopathy, and hypoxia  - CXR showing opacity of left upper lobe  - Likely complicated pneumonia as patient is from NH  - Failed outpatient PO abx in NH  - S/p vanc + cefepime + 2L NS bolus in ED  - C/w vanc + zosyn for complex pneumonia  - Originally on bipap, now on hi flow and saturating well  - Blood cx ngtd  - F/u strep and legionella  - ID consult Dr Velez P/w fever 102.4, , leukocytosis 16, encephalopathy, and hypoxia  - CXR showing opacity of left upper lobe  - Likely complicated pneumonia as patient is from NH  - Failed outpatient PO abx in NH  - S/p vanc + cefepime + 2L NS bolus in ED  - C/w zosyn for complex pneumonia  - Originally on bipap, now on hi flow and saturating well  - Blood cx ngtd  - Strep and RVP negative  - ID consult Dr Velez

## 2019-05-04 NOTE — PROGRESS NOTE ADULT - PROBLEM SELECTOR PLAN 3
On coumadin 3 at home  - P/w supratherapeutic INR on admission to 8.3, now 11.04  - No evidence of bleeding  - Holding coumadin for now  - S/p vitamin K 2.5 PO  - Starting dig and lopressor q8h  - F/u INR daily  - Cardio Dr Henley On coumadin 3 at home  - P/w supratherapeutic INR on admission to 8.3, no evidence of bleeding, may be ?2/2 recent abx  - INR improving, will give coumadin 2 tonight  - F/u INR daily  - Cardio Dr Henley

## 2019-05-04 NOTE — DIETITIAN INITIAL EVALUATION ADULT. - FACTORS AFF FOOD INTAKE
change in mental status/difficulty feeding self/other (specify)/difficulty chewing/difficulty swallowing/difficulty with food procurement/preparation/weakness, SOB, pneumonia, CAD, CKD, CHF, dementia, Parkinson's disease,

## 2019-05-04 NOTE — PROGRESS NOTE ADULT - PROBLEM SELECTOR PLAN 10
IMPROVE VTE Individual Risk Assessment          RISK                                                          Points  [  ] Previous VTE                                                3  [  ] Thrombophilia                                             2  [  ] Lower limb paralysis                                   2        (unable to hold up >15 seconds)    [  ] Current Cancer                                             2         (within 6 months)  [ x ] Immobilization > 24 hrs                              1  [  ] ICU/CCU stay > 24 hours                             1  [ x ] Age > 60                                                         1    IMPROVE VTE Score: 2    No need for vte prophylaxis for now given supratherapeutic INR  F/u INR daily IMPROVE VTE Individual Risk Assessment          RISK                                                          Points  [  ] Previous VTE                                                3  [  ] Thrombophilia                                             2  [  ] Lower limb paralysis                                   2        (unable to hold up >15 seconds)    [  ] Current Cancer                                             2         (within 6 months)  [ x ] Immobilization > 24 hrs                              1  [  ] ICU/CCU stay > 24 hours                             1  [ x ] Age > 60                                                         1    IMPROVE VTE Score: 2  Coumadin for afib, will give 2 today  F/u INR daily

## 2019-05-04 NOTE — DIETITIAN INITIAL EVALUATION ADULT. - MD RECOMMEND
Add Glucerna Shake 1can bid (440kcal, 20g protein) & MVI/minerals/Vit.C for wound healing as medically feasible/po supplement

## 2019-05-04 NOTE — DIETITIAN INITIAL EVALUATION ADULT. - OTHER INFO
Nutrition consult requested for pressure ulcer >2. Patient from [facility]. Visited pt. alert but confused & on high flow oxygen, tried calling wife but no response presently, d/w PCA, pt. with fair appetite, consuming 40-50% of meals & needs assistance w/feeding, no reports of GI distress, encourage po intake, seen by [facility] on 5/2 & 5/3/19 & recommendation noted, pressure ulcers (bilateral buttocks-stage II & bilateral heel-stage I) with wound care noted, d/w RN.

## 2019-05-04 NOTE — PROGRESS NOTE ADULT - ASSESSMENT
Patient admitted for shortness of breath, found to be in sepsis likely secondary to UTI, initially started on BIPAP in ED and transitioned to high flow nasal cannula on 50% FiO2.     GOC: DNR DNI

## 2019-05-04 NOTE — PROGRESS NOTE ADULT - PROBLEM SELECTOR PLAN 2
Elevated CE, peaked at 1.5, trending down  - EKG with intraventricular block  - Avoid AV galina blocking agents

## 2019-05-05 LAB
ANION GAP SERPL CALC-SCNC: 6 MMOL/L — SIGNIFICANT CHANGE UP (ref 5–17)
BASOPHILS # BLD AUTO: 0.07 K/UL — SIGNIFICANT CHANGE UP (ref 0–0.2)
BASOPHILS NFR BLD AUTO: 0.8 % — SIGNIFICANT CHANGE UP (ref 0–2)
BUN SERPL-MCNC: 30 MG/DL — HIGH (ref 7–18)
CALCIUM SERPL-MCNC: 8.5 MG/DL — SIGNIFICANT CHANGE UP (ref 8.4–10.5)
CHLORIDE SERPL-SCNC: 109 MMOL/L — HIGH (ref 96–108)
CO2 SERPL-SCNC: 26 MMOL/L — SIGNIFICANT CHANGE UP (ref 22–31)
CREAT SERPL-MCNC: 1.4 MG/DL — HIGH (ref 0.5–1.3)
EOSINOPHIL # BLD AUTO: 0.17 K/UL — SIGNIFICANT CHANGE UP (ref 0–0.5)
EOSINOPHIL NFR BLD AUTO: 2 % — SIGNIFICANT CHANGE UP (ref 0–6)
GLUCOSE SERPL-MCNC: 107 MG/DL — HIGH (ref 70–99)
HCT VFR BLD CALC: 37.8 % — LOW (ref 39–50)
HGB BLD-MCNC: 12.3 G/DL — LOW (ref 13–17)
IMM GRANULOCYTES NFR BLD AUTO: 0.3 % — SIGNIFICANT CHANGE UP (ref 0–1.5)
INR BLD: 2.22 RATIO — HIGH (ref 0.88–1.16)
LYMPHOCYTES # BLD AUTO: 0.79 K/UL — LOW (ref 1–3.3)
LYMPHOCYTES # BLD AUTO: 9.1 % — LOW (ref 13–44)
MCHC RBC-ENTMCNC: 28.4 PG — SIGNIFICANT CHANGE UP (ref 27–34)
MCHC RBC-ENTMCNC: 32.5 GM/DL — SIGNIFICANT CHANGE UP (ref 32–36)
MCV RBC AUTO: 87.3 FL — SIGNIFICANT CHANGE UP (ref 80–100)
MONOCYTES # BLD AUTO: 0.72 K/UL — SIGNIFICANT CHANGE UP (ref 0–0.9)
MONOCYTES NFR BLD AUTO: 8.3 % — SIGNIFICANT CHANGE UP (ref 2–14)
NEUTROPHILS # BLD AUTO: 6.86 K/UL — SIGNIFICANT CHANGE UP (ref 1.8–7.4)
NEUTROPHILS NFR BLD AUTO: 79.5 % — HIGH (ref 43–77)
NRBC # BLD: 0 /100 WBCS — SIGNIFICANT CHANGE UP (ref 0–0)
PLATELET # BLD AUTO: 188 K/UL — SIGNIFICANT CHANGE UP (ref 150–400)
POTASSIUM SERPL-MCNC: 3.6 MMOL/L — SIGNIFICANT CHANGE UP (ref 3.5–5.3)
POTASSIUM SERPL-SCNC: 3.6 MMOL/L — SIGNIFICANT CHANGE UP (ref 3.5–5.3)
PROTHROM AB SERPL-ACNC: 25.2 SEC — HIGH (ref 10–12.9)
RBC # BLD: 4.33 M/UL — SIGNIFICANT CHANGE UP (ref 4.2–5.8)
RBC # FLD: 18.6 % — HIGH (ref 10.3–14.5)
SODIUM SERPL-SCNC: 141 MMOL/L — SIGNIFICANT CHANGE UP (ref 135–145)
WBC # BLD: 8.64 K/UL — SIGNIFICANT CHANGE UP (ref 3.8–10.5)
WBC # FLD AUTO: 8.64 K/UL — SIGNIFICANT CHANGE UP (ref 3.8–10.5)

## 2019-05-05 RX ORDER — HYDRALAZINE HCL 50 MG
10 TABLET ORAL EVERY 8 HOURS
Qty: 0 | Refills: 0 | Status: DISCONTINUED | OUTPATIENT
Start: 2019-05-05 | End: 2019-05-06

## 2019-05-05 RX ORDER — DIGOXIN 250 MCG
0.12 TABLET ORAL EVERY OTHER DAY
Qty: 0 | Refills: 0 | Status: DISCONTINUED | OUTPATIENT
Start: 2019-05-05 | End: 2019-05-09

## 2019-05-05 RX ORDER — WARFARIN SODIUM 2.5 MG/1
2 TABLET ORAL ONCE
Qty: 0 | Refills: 0 | Status: COMPLETED | OUTPATIENT
Start: 2019-05-05 | End: 2019-05-05

## 2019-05-05 RX ORDER — ISOSORBIDE DINITRATE 5 MG/1
10 TABLET ORAL THREE TIMES A DAY
Qty: 0 | Refills: 0 | Status: DISCONTINUED | OUTPATIENT
Start: 2019-05-05 | End: 2019-05-06

## 2019-05-05 RX ADMIN — Medication 10 MILLIGRAM(S): at 13:42

## 2019-05-05 RX ADMIN — CARBIDOPA AND LEVODOPA 1 TABLET(S): 25; 100 TABLET ORAL at 11:43

## 2019-05-05 RX ADMIN — TAMSULOSIN HYDROCHLORIDE 0.4 MILLIGRAM(S): 0.4 CAPSULE ORAL at 21:46

## 2019-05-05 RX ADMIN — Medication 3 MILLILITER(S): at 22:42

## 2019-05-05 RX ADMIN — Medication 3 MILLILITER(S): at 14:28

## 2019-05-05 RX ADMIN — Medication 25 MILLIGRAM(S): at 21:46

## 2019-05-05 RX ADMIN — ISOSORBIDE DINITRATE 10 MILLIGRAM(S): 5 TABLET ORAL at 13:42

## 2019-05-05 RX ADMIN — PIPERACILLIN AND TAZOBACTAM 25 GRAM(S): 4; .5 INJECTION, POWDER, LYOPHILIZED, FOR SOLUTION INTRAVENOUS at 08:40

## 2019-05-05 RX ADMIN — PIPERACILLIN AND TAZOBACTAM 25 GRAM(S): 4; .5 INJECTION, POWDER, LYOPHILIZED, FOR SOLUTION INTRAVENOUS at 06:03

## 2019-05-05 RX ADMIN — FINASTERIDE 5 MILLIGRAM(S): 5 TABLET, FILM COATED ORAL at 11:41

## 2019-05-05 RX ADMIN — WARFARIN SODIUM 2 MILLIGRAM(S): 2.5 TABLET ORAL at 21:46

## 2019-05-05 RX ADMIN — Medication 81 MILLIGRAM(S): at 11:43

## 2019-05-05 RX ADMIN — Medication 3 MILLILITER(S): at 02:12

## 2019-05-05 RX ADMIN — PIPERACILLIN AND TAZOBACTAM 25 GRAM(S): 4; .5 INJECTION, POWDER, LYOPHILIZED, FOR SOLUTION INTRAVENOUS at 13:42

## 2019-05-05 RX ADMIN — Medication 25 MILLIGRAM(S): at 06:02

## 2019-05-05 RX ADMIN — ATORVASTATIN CALCIUM 40 MILLIGRAM(S): 80 TABLET, FILM COATED ORAL at 21:46

## 2019-05-05 RX ADMIN — Medication 25 MILLIGRAM(S): at 13:42

## 2019-05-05 RX ADMIN — Medication 50 MILLIGRAM(S): at 11:43

## 2019-05-05 RX ADMIN — Medication 20 MILLIGRAM(S): at 06:02

## 2019-05-05 RX ADMIN — Medication 10 MILLIGRAM(S): at 21:46

## 2019-05-05 RX ADMIN — SPIRONOLACTONE 25 MILLIGRAM(S): 25 TABLET, FILM COATED ORAL at 08:40

## 2019-05-05 RX ADMIN — Medication 0.12 MILLIGRAM(S): at 06:02

## 2019-05-05 RX ADMIN — ISOSORBIDE DINITRATE 10 MILLIGRAM(S): 5 TABLET ORAL at 21:46

## 2019-05-05 RX ADMIN — Medication 3 MILLILITER(S): at 10:18

## 2019-05-05 RX ADMIN — Medication 400 UNIT(S): at 11:41

## 2019-05-05 RX ADMIN — PIPERACILLIN AND TAZOBACTAM 25 GRAM(S): 4; .5 INJECTION, POWDER, LYOPHILIZED, FOR SOLUTION INTRAVENOUS at 21:45

## 2019-05-05 NOTE — PROGRESS NOTE ADULT - ASSESSMENT
1.pneumonia  cont  iv zosyn  follow up cxr  2.s/p coagulopathy  restart coumadin  3.a/fid  chf  cont as per cardio  4.igt  follow sugars  5.supp tsh  check free t4/t3  follow

## 2019-05-05 NOTE — PROGRESS NOTE ADULT - SUBJECTIVE AND OBJECTIVE BOX
CHIEF COMPLAINT:Patient is a 84y old  Male who presents with a chief complaint of shortness of breath.Pt still on high flow O2.    	  REVIEW OF SYSTEMS:  CONSTITUTIONAL: No fever, weight loss, or fatigue  EYES: No eye pain, visual disturbances, or discharge  ENT:  No difficulty hearing, tinnitus, vertigo; No sinus or throat pain  NECK: No pain or stiffness  RESPIRATORY: No cough, wheezing, chills or hemoptysis; No Shortness of Breath  CARDIOVASCULAR: No chest pain, palpitations, passing out, dizziness, or leg swelling  GASTROINTESTINAL: No abdominal or epigastric pain. No nausea, vomiting, or hematemesis; No diarrhea or constipation. No melena or hematochezia.  GENITOURINARY: No dysuria, frequency, hematuria, or incontinence  NEUROLOGICAL: No headaches, memory loss, loss of strength, numbness, or tremors  SKIN: No itching, burning, rashes, or lesions   LYMPH Nodes: No enlarged glands  ENDOCRINE: No heat or cold intolerance; No hair loss  MUSCULOSKELETAL: No joint pain or swelling; No muscle, back, or extremity pain  PSYCHIATRIC: No depression, anxiety, mood swings, or difficulty sleeping  HEME/LYMPH: No easy bruising, or bleeding gums  ALLERGY AND IMMUNOLOGIC: No hives or eczema	        PHYSICAL EXAM:  T(C): 36.3 (05-05-19 @ 10:07), Max: 36.9 (05-04-19 @ 14:59)  HR: 85 (05-05-19 @ 10:50) (82 - 100)  BP: 153/96 (05-05-19 @ 10:07) (117/67 - 154/75)  RR: 17 (05-05-19 @ 10:19) (17 - 17)  SpO2: 96% (05-05-19 @ 10:50) (96% - 98%)  Wt(kg): --  I&O's Summary    04 May 2019 07:01  -  05 May 2019 07:00  --------------------------------------------------------  IN: 0 mL / OUT: 1650 mL / NET: -1650 mL    05 May 2019 07:01  -  05 May 2019 12:10  --------------------------------------------------------  IN: 0 mL / OUT: 200 mL / NET: -200 mL        Appearance: Normal	  HEENT:   Normal oral mucosa, PERRL, EOMI	  Lymphatic: No lymphadenopathy  Cardiovascular: Normal S1 S2,+ JVD  Respiratory: Dec BS at bases  Psychiatry: A & O x 3, Mood & affect appropriate  Gastrointestinal:  Soft, Non-tender, + BS	  Skin: No rashes, No ecchymoses, No cyanosis	  Neurologic: Non-focal  Extremities: Normal range of motion, No clubbing, cyanosis or edema  Vascular: Peripheral pulses palpable 2+ bilaterally    MEDICATIONS  (STANDING):  ALBUTerol/ipratropium for Nebulization 3 milliLiter(s) Nebulizer every 6 hours  aspirin  chewable 81 milliGRAM(s) Oral daily  atorvastatin 40 milliGRAM(s) Oral at bedtime  carbidopa/levodopa  25/100 1 Tablet(s) Oral daily  cholecalciferol 400 Unit(s) Oral daily  digoxin     Tablet 0.125 milliGRAM(s) Oral every other day  finasteride 5 milliGRAM(s) Oral daily  furosemide   Injectable 20 milliGRAM(s) IV Push daily  hydrALAZINE 10 milliGRAM(s) Oral every 8 hours  isosorbide   dinitrate Tablet (ISORDIL) 10 milliGRAM(s) Oral three times a day  metoprolol tartrate 25 milliGRAM(s) Oral every 8 hours  piperacillin/tazobactam IVPB. 3.375 Gram(s) IV Intermittent every 8 hours  pyridoxine 50 milliGRAM(s) Oral daily  spironolactone 25 milliGRAM(s) Oral daily  tamsulosin 0.4 milliGRAM(s) Oral at bedtime  warfarin 2 milliGRAM(s) Oral once        	  LABS:	 	    CARDIAC MARKERS:  CARDIAC MARKERS ( 03 May 2019 14:04 )  0.717 ng/mL / x     / 154 U/L / x     / 8.5 ng/mL                                12.3   8.64  )-----------( 188      ( 05 May 2019 07:19 )             37.8     05-05    141  |  109<H>  |  30<H>  ----------------------------<  107<H>  3.6   |  26  |  1.40<H>    Ca    8.5      05 May 2019 07:19      proBNP: Serum Pro-Brain Natriuretic Peptide: 5858 pg/mL (05-02 @ 01:39)    Lipid Profile: Cholesterol <50  LDL <17  HDL 27  TG 30    HgA1c: Hemoglobin A1C, Whole Blood: 6.1 % (05-02 @ 14:38)    TSH: Thyroid Stimulating Hormone, Serum: 0.21 uU/mL (05-02 @ 09:55)      PT/INR - ( 05 May 2019 07:19 )   PT: 25.2 sec;   INR: 2.22 ratio           OBSERVATIONS:  Mitral Valve: Normal mitral valve. Mild mitral  regurgitation.  Aortic Root: Aortic Root: 3.9 cm.  Aortic Valve: Calcified trileaflet aortic valve with normal  opening. Mild aortic regurgitation.  Left Atrium: Severely dilated left atrium.  LA volume index  = 78 cc/m2.  Left Ventricle: Severe global left ventricular systolic  dysfunction. Moderate concentric left ventricular  hypertrophy. Grade III diastolic dysfunction.  Right Heart: Normal right atrium. Normal right ventricular  size with decreased RV systolic function (TAPSE .8cm).  There is moderate tricuspid regurgitation. There is mild  pulmonic regurgitation.  Pericardium/PleuraNormal pericardium with no pericardial  effusion. Bilateral pleural effusions.  Hemodynamic: RV systolic pressure is moderately increased  at  59 mm Hg.

## 2019-05-05 NOTE — PROGRESS NOTE ADULT - ASSESSMENT
84 year old male PMH CAD, CVA, CKD, BPH, Parkinson's, dementia, Afib on coumadin, HTN, CHF, PVD, HLD was brought in by ambulance from [facility] for shortness of breath,Lt sided pneumonia, elevated INR, and + troponis due to demand ischemia-Type II MI,acute systolic HF.  1.Tele monitoring.  2.ABX.  3.Afib-dig .125mg qd, lopressor 25mg q8h, coumadin INR 2-3.  4.CHF-IV lasix and aldactone,add isordil and hydralazine 10mg tid..  5.CAD-asa,b blocker,statin.  6.CVA-coumadin,statin.  7.BPH-flomax,proscar.

## 2019-05-05 NOTE — PROGRESS NOTE ADULT - SUBJECTIVE AND OBJECTIVE BOX
cov dr mathew  HPI:  84 year old male PMH CAD, CVA, CKD, BPH, Parkinson's, dementia, Afib on coumadin, HTN, CHF, PVD, HLD was [place] in by ambulance [place] [facility] [place]. . unable to obtain further history from pt 2/2 dementia. According to ED provider, son states that patient at baseline follows basic commands and is confused; FULL CODE. (02 May 2019 06:15)      Meds:  piperacillin/tazobactam IVPB. 3.375 Gram(s) IV Intermittent every 8 hours    Allergies:  Allergies    No Known Allergies    Intolerances        REVIEW OF SYSTEMS:  still sob  no pain  belle diet  no abd pain  no vomiting    CONSTITUTIONAL: No weakness, fevers or chills  EYES/ENT: No visual changes;  No vertigo or throat pain   NECK: No pain or stiffness  RESPIRATORY: No cough, wheezing, hemoptysis; No shortness of breath  CARDIOVASCULAR: No chest pain or palpitations  GASTROINTESTINAL: No abdominal or epigastric pain. No nausea, vomiting, or hematemesis; No diarrhea or constipation. No melena or hematochezia.  GENITOURINARY: No dysuria, frequency or hematuria  NEUROLOGICAL: No numbness or weakness  SKIN: No itching, burning, rashes, or lesions   All other review of systems is negative unless indicated above.    PHYSICAL EXAM:    Vital Signs Last 24 Hrs  T(C): 36.7 (05 May 2019 17:47), Max: 36.9 (04 May 2019 21:54)  T(F): 98.1 (05 May 2019 17:47), Max: 98.5 (04 May 2019 21:54)  HR: 85 (05 May 2019 17:47) (82 - 97)  BP: 125/75 (05 May 2019 17:47) (125/75 - 153/96)  BP(mean): --  RR: 17 (05 May 2019 17:47) (16 - 17)  SpO2: 97% (05 May 2019 17:47) (95% - 99%)    HEENT:  elderly male  chronically ill  awake  lungs ae reduced/rales  cardia irreg  abd soft  le chronic changes  moves limbs    Neck:  [  ] Supple  [  ] Lymphadenopathy  [  ] JVD  [  ] Masses  [  ] WNL    CHEST/Respiratory: [ ] Clear to auscultation     [  ] Rales      [  ] Rhonchi      [  ] Wheezing       [  ] Chest Tenderness     [  ] WNL    Cardiovascular:  [  ]S1 S2  [  ] Reg  [  ] Irreg   [  ] No Murmurs   [  ] Murmurs  [  ] Systolic [  ] Diastolic    Gastrointestinal:  [  ] Bowel Sounds  [   ] ABD Soft  [  ] ABD Distention   [  ] No Tenderness [  ] Tenderness  [  ] Organomegaly  [  ] Guarding rigidity  [  ] No Guarding rigidity  [  ] Rebound Tenderness [  ] No Rebound Tenderness    Extremities: [  ] Edema  [  ] No Edema  [  ] Clubbing   [  ] Cyanosis  [  ] Palpable peripheral pulses                          [  ] Tender calf muscles    [  ] No Tender Calf Muscles    Neurological:  [  ] Alert  [  ] Awake  [  ] Oriented  x                              [  ] Focal weakness  [  ] No Focal Weakness    Skin:  [  ] Thrombophlebitis  [  ] Rashes  [  ] Dry  [  ] Ulcers    Ortho:  [  ] Joint Swelling  [  ] Joint erythema [  ] DJD [  ] Increased Temperature to Touch      LABS/DIAGNOSTIC TESTS                          12.3   8.64  )-----------( 188      ( 05 May 2019 07:19 )             37.8         05-05    141  |  109<H>  |  30<H>  ----------------------------<  107<H>  3.6   |  26  |  1.40<H>    Ca    8.5      05 May 2019 07:19        CAPILLARY BLOOD GLUCOSE            Hemoglobin A1C, Whole Blood: 6.1 % (05-02 @ 14:38)    Thyroid Stimulating Hormone, Serum: 0.21 uU/mL (05-02 @ 09:55)    CULTURES:       RADIOLOGY  CXR:    ALBUTerol/ipratropium for Nebulization 3 milliLiter(s) Nebulizer every 6 hours  aspirin  chewable 81 milliGRAM(s) Oral daily  atorvastatin 40 milliGRAM(s) Oral at bedtime  carbidopa/levodopa  25/100 1 Tablet(s) Oral daily  cholecalciferol 400 Unit(s) Oral daily  digoxin     Tablet 0.125 milliGRAM(s) Oral every other day  finasteride 5 milliGRAM(s) Oral daily  furosemide   Injectable 20 milliGRAM(s) IV Push daily  hydrALAZINE 10 milliGRAM(s) Oral every 8 hours  isosorbide   dinitrate Tablet (ISORDIL) 10 milliGRAM(s) Oral three times a day  metoprolol tartrate 25 milliGRAM(s) Oral every 8 hours  piperacillin/tazobactam IVPB. 3.375 Gram(s) IV Intermittent every 8 hours  pyridoxine 50 milliGRAM(s) Oral daily  spironolactone 25 milliGRAM(s) Oral daily  tamsulosin 0.4 milliGRAM(s) Oral at bedtime  warfarin 2 milliGRAM(s) Oral once

## 2019-05-06 DIAGNOSIS — I50.40 UNSPECIFIED COMBINED SYSTOLIC (CONGESTIVE) AND DIASTOLIC (CONGESTIVE) HEART FAILURE: ICD-10-CM

## 2019-05-06 LAB
ANION GAP SERPL CALC-SCNC: 5 MMOL/L — SIGNIFICANT CHANGE UP (ref 5–17)
BASOPHILS # BLD AUTO: 0.07 K/UL — SIGNIFICANT CHANGE UP (ref 0–0.2)
BASOPHILS NFR BLD AUTO: 0.9 % — SIGNIFICANT CHANGE UP (ref 0–2)
BUN SERPL-MCNC: 27 MG/DL — HIGH (ref 7–18)
CALCIUM SERPL-MCNC: 8.2 MG/DL — LOW (ref 8.4–10.5)
CHLORIDE SERPL-SCNC: 110 MMOL/L — HIGH (ref 96–108)
CO2 SERPL-SCNC: 28 MMOL/L — SIGNIFICANT CHANGE UP (ref 22–31)
CREAT SERPL-MCNC: 1.22 MG/DL — SIGNIFICANT CHANGE UP (ref 0.5–1.3)
EOSINOPHIL # BLD AUTO: 0.16 K/UL — SIGNIFICANT CHANGE UP (ref 0–0.5)
EOSINOPHIL NFR BLD AUTO: 2 % — SIGNIFICANT CHANGE UP (ref 0–6)
GLUCOSE SERPL-MCNC: 88 MG/DL — SIGNIFICANT CHANGE UP (ref 70–99)
HCT VFR BLD CALC: 36.1 % — LOW (ref 39–50)
HGB BLD-MCNC: 11.6 G/DL — LOW (ref 13–17)
IMM GRANULOCYTES NFR BLD AUTO: 0.4 % — SIGNIFICANT CHANGE UP (ref 0–1.5)
INR BLD: 2.28 RATIO — HIGH (ref 0.88–1.16)
LYMPHOCYTES # BLD AUTO: 0.86 K/UL — LOW (ref 1–3.3)
LYMPHOCYTES # BLD AUTO: 11 % — LOW (ref 13–44)
MCHC RBC-ENTMCNC: 28.2 PG — SIGNIFICANT CHANGE UP (ref 27–34)
MCHC RBC-ENTMCNC: 32.1 GM/DL — SIGNIFICANT CHANGE UP (ref 32–36)
MCV RBC AUTO: 87.6 FL — SIGNIFICANT CHANGE UP (ref 80–100)
MONOCYTES # BLD AUTO: 0.65 K/UL — SIGNIFICANT CHANGE UP (ref 0–0.9)
MONOCYTES NFR BLD AUTO: 8.3 % — SIGNIFICANT CHANGE UP (ref 2–14)
NEUTROPHILS # BLD AUTO: 6.06 K/UL — SIGNIFICANT CHANGE UP (ref 1.8–7.4)
NEUTROPHILS NFR BLD AUTO: 77.4 % — HIGH (ref 43–77)
NRBC # BLD: 0 /100 WBCS — SIGNIFICANT CHANGE UP (ref 0–0)
PLATELET # BLD AUTO: 173 K/UL — SIGNIFICANT CHANGE UP (ref 150–400)
POTASSIUM SERPL-MCNC: 3.4 MMOL/L — LOW (ref 3.5–5.3)
POTASSIUM SERPL-SCNC: 3.4 MMOL/L — LOW (ref 3.5–5.3)
PROTHROM AB SERPL-ACNC: 26 SEC — HIGH (ref 10–12.9)
RBC # BLD: 4.12 M/UL — LOW (ref 4.2–5.8)
RBC # FLD: 18.6 % — HIGH (ref 10.3–14.5)
SODIUM SERPL-SCNC: 143 MMOL/L — SIGNIFICANT CHANGE UP (ref 135–145)
WBC # BLD: 7.83 K/UL — SIGNIFICANT CHANGE UP (ref 3.8–10.5)
WBC # FLD AUTO: 7.83 K/UL — SIGNIFICANT CHANGE UP (ref 3.8–10.5)

## 2019-05-06 PROCEDURE — 71045 X-RAY EXAM CHEST 1 VIEW: CPT | Mod: 26

## 2019-05-06 RX ORDER — LISINOPRIL 2.5 MG/1
2.5 TABLET ORAL
Qty: 0 | Refills: 0 | Status: DISCONTINUED | OUTPATIENT
Start: 2019-05-06 | End: 2019-05-09

## 2019-05-06 RX ORDER — POTASSIUM CHLORIDE 20 MEQ
40 PACKET (EA) ORAL ONCE
Qty: 0 | Refills: 0 | Status: COMPLETED | OUTPATIENT
Start: 2019-05-06 | End: 2019-05-06

## 2019-05-06 RX ORDER — WARFARIN SODIUM 2.5 MG/1
2 TABLET ORAL ONCE
Qty: 0 | Refills: 0 | Status: COMPLETED | OUTPATIENT
Start: 2019-05-06 | End: 2019-05-06

## 2019-05-06 RX ADMIN — SPIRONOLACTONE 25 MILLIGRAM(S): 25 TABLET, FILM COATED ORAL at 05:43

## 2019-05-06 RX ADMIN — FINASTERIDE 5 MILLIGRAM(S): 5 TABLET, FILM COATED ORAL at 11:24

## 2019-05-06 RX ADMIN — Medication 3 MILLILITER(S): at 09:28

## 2019-05-06 RX ADMIN — Medication 400 UNIT(S): at 11:24

## 2019-05-06 RX ADMIN — Medication 3 MILLILITER(S): at 21:45

## 2019-05-06 RX ADMIN — Medication 3 MILLILITER(S): at 14:14

## 2019-05-06 RX ADMIN — Medication 25 MILLIGRAM(S): at 05:43

## 2019-05-06 RX ADMIN — TAMSULOSIN HYDROCHLORIDE 0.4 MILLIGRAM(S): 0.4 CAPSULE ORAL at 23:13

## 2019-05-06 RX ADMIN — Medication 50 MILLIGRAM(S): at 11:25

## 2019-05-06 RX ADMIN — PIPERACILLIN AND TAZOBACTAM 25 GRAM(S): 4; .5 INJECTION, POWDER, LYOPHILIZED, FOR SOLUTION INTRAVENOUS at 23:13

## 2019-05-06 RX ADMIN — Medication 81 MILLIGRAM(S): at 11:25

## 2019-05-06 RX ADMIN — Medication 3 MILLILITER(S): at 03:23

## 2019-05-06 RX ADMIN — PIPERACILLIN AND TAZOBACTAM 25 GRAM(S): 4; .5 INJECTION, POWDER, LYOPHILIZED, FOR SOLUTION INTRAVENOUS at 05:45

## 2019-05-06 RX ADMIN — ATORVASTATIN CALCIUM 40 MILLIGRAM(S): 80 TABLET, FILM COATED ORAL at 23:13

## 2019-05-06 RX ADMIN — Medication 40 MILLIEQUIVALENT(S): at 08:46

## 2019-05-06 RX ADMIN — Medication 10 MILLIGRAM(S): at 05:43

## 2019-05-06 RX ADMIN — Medication 25 MILLIGRAM(S): at 23:13

## 2019-05-06 RX ADMIN — CARBIDOPA AND LEVODOPA 1 TABLET(S): 25; 100 TABLET ORAL at 11:24

## 2019-05-06 RX ADMIN — ISOSORBIDE DINITRATE 10 MILLIGRAM(S): 5 TABLET ORAL at 05:43

## 2019-05-06 RX ADMIN — Medication 0.12 MILLIGRAM(S): at 11:24

## 2019-05-06 RX ADMIN — PIPERACILLIN AND TAZOBACTAM 25 GRAM(S): 4; .5 INJECTION, POWDER, LYOPHILIZED, FOR SOLUTION INTRAVENOUS at 13:10

## 2019-05-06 RX ADMIN — Medication 20 MILLIGRAM(S): at 05:43

## 2019-05-06 RX ADMIN — Medication 25 MILLIGRAM(S): at 13:10

## 2019-05-06 RX ADMIN — LISINOPRIL 2.5 MILLIGRAM(S): 2.5 TABLET ORAL at 17:10

## 2019-05-06 RX ADMIN — WARFARIN SODIUM 2 MILLIGRAM(S): 2.5 TABLET ORAL at 23:13

## 2019-05-06 NOTE — PROGRESS NOTE ADULT - PROBLEM SELECTOR PLAN 1
P/w fever 102.4, , leukocytosis 16, encephalopathy, and hypoxia  - CXR showing opacity of left upper lobe  - Likely complicated pneumonia as patient is from NH  - Failed outpatient PO abx in NH  - S/p vanc + cefepime + 2L NS bolus in ED  - C/w zosyn for complex pneumonia  - Originally on bipap, now on hi flow and saturating well  - Blood cx ngtd  - Strep and RVP negative  - f/u CT chest   - ID consult Dr Velez

## 2019-05-06 NOTE — PROGRESS NOTE ADULT - SUBJECTIVE AND OBJECTIVE BOX
CHIEF COMPLAINT:Patient is a 84y old  Male who presents with a chief complaint of shortness of breath.Pt appears comfortable,still on high flow O2.    	  REVIEW OF SYSTEMS:  CONSTITUTIONAL: No fever, weight loss, or fatigue  EYES: No eye pain, visual disturbances, or discharge  ENT:  No difficulty hearing, tinnitus, vertigo; No sinus or throat pain  NECK: No pain or stiffness  RESPIRATORY: No cough, wheezing, chills or hemoptysis; No Shortness of Breath  CARDIOVASCULAR: No chest pain, palpitations, passing out, dizziness, or leg swelling  GASTROINTESTINAL: No abdominal or epigastric pain. No nausea, vomiting, or hematemesis; No diarrhea or constipation. No melena or hematochezia.  GENITOURINARY: No dysuria, frequency, hematuria, or incontinence  NEUROLOGICAL: No headaches, memory loss, loss of strength, numbness, or tremors  SKIN: No itching, burning, rashes, or lesions   LYMPH Nodes: No enlarged glands  ENDOCRINE: No heat or cold intolerance; No hair loss  MUSCULOSKELETAL: No joint pain or swelling; No muscle, back, or extremity pain  PSYCHIATRIC: No depression, anxiety, mood swings, or difficulty sleeping  HEME/LYMPH: No easy bruising, or bleeding gums  ALLERGY AND IMMUNOLOGIC: No hives or eczema	      PHYSICAL EXAM:  T(C): 36.2 (05-06-19 @ 10:41), Max: 36.7 (05-05-19 @ 14:56)  HR: 84 (05-06-19 @ 10:41) (80 - 92)  BP: 148/78 (05-06-19 @ 10:41) (125/75 - 148/78)  RR: 17 (05-06-19 @ 10:41) (16 - 17)  SpO2: 97% (05-06-19 @ 10:41) (95% - 100%)  Wt(kg): --  I&O's Summary    05 May 2019 07:01  -  06 May 2019 07:00  --------------------------------------------------------  IN: 0 mL / OUT: 1250 mL / NET: -1250 mL        Appearance: Normal	  HEENT:   Normal oral mucosa, PERRL, EOMI	  Lymphatic: No lymphadenopathy  Cardiovascular: Normal S1 S2, + JVD, No murmurs, No edema  Respiratory: Dec bS at bases  Psychiatry: A & O x 3, Mood & affect appropriate  Gastrointestinal:  Soft, Non-tender, + BS	  Skin: No rashes, No ecchymoses, No cyanosis	  Neurologic: Non-focal  Extremities: Normal range of motion, No clubbing, cyanosis or edema  Vascular: Peripheral pulses palpable 2+ bilaterally    MEDICATIONS  (STANDING):  ALBUTerol/ipratropium for Nebulization 3 milliLiter(s) Nebulizer every 6 hours  aspirin  chewable 81 milliGRAM(s) Oral daily  atorvastatin 40 milliGRAM(s) Oral at bedtime  carbidopa/levodopa  25/100 1 Tablet(s) Oral daily  cholecalciferol 400 Unit(s) Oral daily  digoxin     Tablet 0.125 milliGRAM(s) Oral every other day  finasteride 5 milliGRAM(s) Oral daily  furosemide   Injectable 20 milliGRAM(s) IV Push daily  hydrALAZINE 10 milliGRAM(s) Oral every 8 hours  isosorbide   dinitrate Tablet (ISORDIL) 10 milliGRAM(s) Oral three times a day  metoprolol tartrate 25 milliGRAM(s) Oral every 8 hours  piperacillin/tazobactam IVPB. 3.375 Gram(s) IV Intermittent every 8 hours  pyridoxine 50 milliGRAM(s) Oral daily  spironolactone 25 milliGRAM(s) Oral daily  tamsulosin 0.4 milliGRAM(s) Oral at bedtime  warfarin 2 milliGRAM(s) Oral once        	  LABS:	 	                        11.6   7.83  )-----------( 173      ( 06 May 2019 06:44 )             36.1     05-06    143  |  110<H>  |  27<H>  ----------------------------<  88  3.4<L>   |  28  |  1.22    Ca    8.2<L>      06 May 2019 06:44      proBNP: Serum Pro-Brain Natriuretic Peptide: 5858 pg/mL (05-02 @ 01:39)    Lipid Profile: Cholesterol <50  LDL <17  HDL 27  TG 30    HgA1c: Hemoglobin A1C, Whole Blood: 6.1 % (05-02 @ 14:38)    TSH: Thyroid Stimulating Hormone, Serum: 0.21 uU/mL (05-02 @ 09:55)      	  PT/INR - ( 06 May 2019 06:44 )   PT: 26.0 sec;   INR: 2.28 ratio

## 2019-05-06 NOTE — PROGRESS NOTE ADULT - PROBLEM SELECTOR PLAN 2
Patient presented with initially elevated troponins, BNP 5k,   Echo noted with EF 15-20%, gr3 DD, moderate conc LVH, severely dilated LA, increased RV systolic pressure.   -patient in iv lasix, aldactone, acei as per cardio Dr Henley  -monitor BP, BMP  -f/u ct chest

## 2019-05-06 NOTE — PROGRESS NOTE ADULT - ASSESSMENT
84 year old male PMH CAD, CVA, CKD, BPH, Parkinson's, dementia, Afib on coumadin, HTN, CHF, PVD, HLD was brought in by ambulance from [facility] for shortness of breath,Lt sided pneumonia, elevated INR, and + troponis due to demand ischemia-Type II MI,acute systolic HF.  1.Tele monitoring.  2.ABX.  3.Afib-dig .125mg qd, lopressor 25mg q8h, coumadin INR 2-3.  4.CHF-IV lasix, aldactone, add low dose ace and d/c isordil and hydralazine.  5.CAD-asa,b blocker,statin.  6.CVA-coumadin,statin.  7.BPH-flomax,proscar.

## 2019-05-06 NOTE — PROGRESS NOTE ADULT - PROBLEM SELECTOR PLAN 10
IMPROVE VTE Individual Risk Assessment          RISK                                                          Points  [  ] Previous VTE                                                3  [  ] Thrombophilia                                             2  [  ] Lower limb paralysis                                   2        (unable to hold up >15 seconds)    [  ] Current Cancer                                             2         (within 6 months)  [ x ] Immobilization > 24 hrs                              1  [  ] ICU/CCU stay > 24 hours                             1  [ x ] Age > 60                                                         1    IMPROVE VTE Score: 2  Coumadin for afib, will give 2 today  F/u INR daily

## 2019-05-06 NOTE — PROGRESS NOTE ADULT - SUBJECTIVE AND OBJECTIVE BOX
PGY1 Note discussed with supervising resident and primary attending.    Patient is a 84y old  Male who presents with a chief complaint of shortness of breath (06 May 2019 12:11)      INTERVAL HPI/OVERNIGHT EVENTS: patient assessed bedside, says breathing is comfortable on high dao. Offers no new complaints.    MEDICATIONS  (STANDING):  ALBUTerol/ipratropium for Nebulization 3 milliLiter(s) Nebulizer every 6 hours  aspirin  chewable 81 milliGRAM(s) Oral daily  atorvastatin 40 milliGRAM(s) Oral at bedtime  carbidopa/levodopa  25/100 1 Tablet(s) Oral daily  cholecalciferol 400 Unit(s) Oral daily  digoxin     Tablet 0.125 milliGRAM(s) Oral every other day  finasteride 5 milliGRAM(s) Oral daily  furosemide   Injectable 20 milliGRAM(s) IV Push daily  lisinopril 2.5 milliGRAM(s) Oral two times a day  metoprolol tartrate 25 milliGRAM(s) Oral every 8 hours  piperacillin/tazobactam IVPB. 3.375 Gram(s) IV Intermittent every 8 hours  pyridoxine 50 milliGRAM(s) Oral daily  spironolactone 25 milliGRAM(s) Oral daily  tamsulosin 0.4 milliGRAM(s) Oral at bedtime  warfarin 2 milliGRAM(s) Oral once    MEDICATIONS  (PRN):      Allergies    No Known Allergies    Intolerances        REVIEW OF SYSTEMS:  CONSTITUTIONAL: No fever, or fatigue  RESPIRATORY: No cough, wheezing; shortness of breath better on HFNC  CARDIOVASCULAR: No chest pain, palpitations, leg swelling  GASTROINTESTINAL: No abdominal pain. No nausea, vomiting, diarrhea or constipation.  NEUROLOGICAL: No headaches, tremors  SKIN: No itching, burning, rashes, or lesions     Vital Signs Last 24 Hrs  T(C): 36.2 (06 May 2019 10:41), Max: 36.7 (05 May 2019 14:56)  T(F): 97.2 (06 May 2019 10:41), Max: 98.1 (05 May 2019 17:47)  HR: 84 (06 May 2019 10:41) (80 - 92)  BP: 148/78 (06 May 2019 10:41) (125/75 - 148/78)  BP(mean): --  RR: 17 (06 May 2019 10:41) (16 - 17)  SpO2: 97% (06 May 2019 10:41) (95% - 100%)    PHYSICAL EXAM:  GENERAL: NAD, well-groomed, well-developed, on HFNC  CHEST/LUNG: Clear to auscultation  HEART: Regular rate and rhythm; No murmurs, rubs, or gallops  ABDOMEN: Soft, Nontender, Nondistended; Bowel sounds present  NERVOUS SYSTEM:  Awake & alert, no focal deficits  EXTREMITIES:  2+ Peripheral Pulses, No clubbing, cyanosis, or edema      LABS:                        11.6   7.83  )-----------( 173      ( 06 May 2019 06:44 )             36.1     05-06    143  |  110<H>  |  27<H>  ----------------------------<  88  3.4<L>   |  28  |  1.22    Ca    8.2<L>      06 May 2019 06:44      PT/INR - ( 06 May 2019 06:44 )   PT: 26.0 sec;   INR: 2.28 ratio             Consultant(s) Notes Reviewed:  [ x ] YES  [ ] NO

## 2019-05-06 NOTE — PROGRESS NOTE ADULT - PROBLEM SELECTOR PLAN 3
On coumadin 3 at home  - P/w supratherapeutic INR on admission to 8.3, no evidence of bleeding, may be ?2/2 recent abx  - INR improving, will give coumadin 2 tonight  - F/u INR daily  - Cardio Dr Henley

## 2019-05-06 NOTE — ADVANCED PRACTICE NURSE CONSULT - RECOMMEDATIONS
-Clean all affected areas with normal saline and apply skin prep to the surrounding skin  -Apply TRIAD Moisture Barrier Cream to the Bilateral Gluteal and Ischial areas b.i.d. PRN  -Elevate/float the patients heels using heel protectors and reposition the patient Q 2hrs using wedges or pillows

## 2019-05-06 NOTE — ADVANCED PRACTICE NURSE CONSULT - ASSESSMENT
This is a 84yr old male patient admitted for Pneumonia, presenting with the following:  -There is a Stage 1 Pressure Injury to the Bilateral Lat Foot areas, as evident by non-blanchable erythema  -There is a Stage 2 Pressure Injury to the R. Gluteus and L. Ischial areas with red tissue and scant drainage

## 2019-05-06 NOTE — PROGRESS NOTE ADULT - ASSESSMENT
-HCAP-left mid lung-increased on current cxr  -elevated troponin-2/2 demand ischemia  - resolved supertx INR  -low ef-ef 15-20%,grade 3 diastolic dysfct.  -HX; CAD,AF,HTN,CHF,HLD,PVD CVA,DEMENTIA, PD, CKD,BPH    PLAN; ZOSYN           CT CHEST           F/U INR           CARDIOF/U -ACEI/LASIX/B BLOCKER/DIG           DYSPHAGIA DIET  SPOKE WITH RESIDENT

## 2019-05-06 NOTE — PROGRESS NOTE ADULT - SUBJECTIVE AND OBJECTIVE BOX
Patient is a 84y old  Male who presents with a chief complaint of shortness of breath (06 May 2019 10:51)      INTERVAL HPI/OVERNIGHT EVENTS:      VITAL SIGNS:  T(F): 97.2 (05-06-19 @ 10:41)  HR: 84 (05-06-19 @ 10:41)  BP: 148/78 (05-06-19 @ 10:41)  RR: 17 (05-06-19 @ 10:41)  SpO2: 97% (05-06-19 @ 10:41)  Wt(kg): --  I&O's Detail    05 May 2019 07:01  -  06 May 2019 07:00  --------------------------------------------------------  IN:  Total IN: 0 mL    OUT:    Indwelling Catheter - Urethral: 1250 mL  Total OUT: 1250 mL    Total NET: -1250 mL              REVIEW OF SYSTEMS:    CONSTITUTIONAL:  No fevers, chills, sweats    HEENT:  Eyes:  No diplopia or blurred vision. ENT:  No earache, sore throat or runny nose.    CARDIOVASCULAR:  No pressure, squeezing, tightness, or heaviness about the chest; no palpitations.    RESPIRATORY:  Per HPI    GASTROINTESTINAL:  No abdominal pain, nausea, vomiting or diarrhea.    GENITOURINARY:  No dysuria, frequency or urgency.    NEUROLOGIC:  No paresthesias, fasciculations, seizures or weakness.    PSYCHIATRIC:  No disorder of thought or mood.      PHYSICAL EXAM:    Constitutional:no complaints  ENMT:atnc  Neck:supple  Respiratory:clear  Cardiovascular:rr  Gastrointestinal:soft  Extremities:no edema  Vascular:intact  Neurological:non focal  Musculoskeletal:no edema, normal rom    MEDICATIONS  (STANDING):  ALBUTerol/ipratropium for Nebulization 3 milliLiter(s) Nebulizer every 6 hours  aspirin  chewable 81 milliGRAM(s) Oral daily  atorvastatin 40 milliGRAM(s) Oral at bedtime  carbidopa/levodopa  25/100 1 Tablet(s) Oral daily  cholecalciferol 400 Unit(s) Oral daily  digoxin     Tablet 0.125 milliGRAM(s) Oral every other day  finasteride 5 milliGRAM(s) Oral daily  furosemide   Injectable 20 milliGRAM(s) IV Push daily  lisinopril 2.5 milliGRAM(s) Oral two times a day  metoprolol tartrate 25 milliGRAM(s) Oral every 8 hours  piperacillin/tazobactam IVPB. 3.375 Gram(s) IV Intermittent every 8 hours  pyridoxine 50 milliGRAM(s) Oral daily  spironolactone 25 milliGRAM(s) Oral daily  tamsulosin 0.4 milliGRAM(s) Oral at bedtime  warfarin 2 milliGRAM(s) Oral once    MEDICATIONS  (PRN):      Allergies    No Known Allergies    Intolerances        LABS:                        11.6   7.83  )-----------( 173      ( 06 May 2019 06:44 )             36.1     05-06    143  |  110<H>  |  27<H>  ----------------------------<  88  3.4<L>   |  28  |  1.22    Ca    8.2<L>      06 May 2019 06:44      PT/INR - ( 06 May 2019 06:44 )   PT: 26.0 sec;   INR: 2.28 ratio                           pro-bnp 5858 05-02 @ 01:39         RADIOLOGY & ADDITIONAL TESTS:    CXR:  EXAM:  XR CHEST PORTABLE ROUTINE 1V                            PROCEDURE DATE:  05/06/2019          INTERPRETATION:  AP semisupine chest on May 6, 2019 at 10:56 AM. Patient   is hypoxic.    Heart size is likely enlarged.    On May 2 there was a consolidation laterally in the left midlung field.    Left lung findings are markedly increased now with opacification of the   left lower lung field likely a combination of atelectasis and effusion.    IMPRESSION: Increasing left lower lung field findings as above. Heart   enlargement again suspect.      echo:  Patient name: MADHAV MANCINI  YOB: 1934   Age: 84 (M)   MR#: [id number]  Study Date: 5/4/2019  Location: [address] [place], [place] [address]grapher: Leroy Schwab [facility]  Study quality: Technically good  Referring Physician:  YOBANI OSMAN MD  Blood Pressure: 118/65 mmHg  Height: 190 cm  Weight: 91 kg  BSA: 2.2 m2  ------------------------------------------------------------------------    PROCEDURE: Transthoracic echocardiogram with 2-D, M-Mode  and complete spectral and color flow Doppler.  INDICATION:Chronic ischemic heart disease, unspecified  (I25.9)  HISTORY:  ------------------------------------------------------------------------  DIMENSIONS:  Dimensions:     Normal Values:  LA:     5.6 cm    2.0 - 4.0 cm  Ao:     3.9 cm    2.0 - 3.8 cm  SEPTUM: 1.8 cm    0.6 - 1.2 cm  PWT:    1.5 cm    0.6 - 1.1 cm  LVIDd:  4.6 cm    3.0 - 5.6 cm  LVIDs:  3.8 cm    1.8 - 4.0 cm      Derived Variables:  LVMI: 150 g/m2  RWT: 0.65  Ejection Fraction Visual Estimate: 15-20 %    ------------------------------------------------------------------------  OBSERVATIONS:  Mitral Valve: Normal mitral valve. Mild mitral  regurgitation.  Aortic Root: Aortic Root: 3.9 cm.    Aortic Valve: Calcified trileaflet aortic valve with normal  opening. Mild aortic regurgitation.  Left Atrium: Severely dilated left atrium.  LA volume index  = 78 cc/m2.  Left Ventricle: Severe global left ventricular systolic  dysfunction. Moderate concentric left ventricular  hypertrophy. Grade III diastolic dysfunction.  Right Heart: Normal right atrium. Normal right ventricular  size with decreased RV systolic function (TAPSE .8cm).  There is moderate tricuspid regurgitation. There is mild  pulmonic regurgitation.  Pericardium/PleuraNormal pericardium with no pericardial  effusion. Bilateral pleural effusions.  Hemodynamic: RV systolic pressure is moderately increased  at  59 mm Hg.  ------------------------------------------------------------------------  CONCLUSIONS:  1. Normal mitral valve. Mild mitral regurgitation.  2. Calcified trileaflet aortic valve with normal opening.  Mild aortic regurgitation.  3. Aortic Root: 3.9 cm.  4. Severely dilated left atrium.  LA volume index = 78  cc/m2.  5. Moderate concentric left ventricular hypertrophy.  6. Severe global left ventricular systolic dysfunction.  7. Grade III diastolic dysfunction.  8. Normal right atrium.  9. Normal right ventricular size with decreased RV systolic  function (TAPSE .8cm).  10. RV systolic pressure is moderately increased at  59 mm  Hg.  11. There is moderate tricuspid regurgitation.  12. There is mild pulmonic regurgitation.  13. Normal pericardium with no pericardial effusion.  14. Bilateral pleural effusions.    ------------------------------------------------------------------------  Confirmed on  5/5/2019 - 09:56:39 by Alyssia Henley MD  ------------------------------------------------------------------------

## 2019-05-07 LAB
ANION GAP SERPL CALC-SCNC: 5 MMOL/L — SIGNIFICANT CHANGE UP (ref 5–17)
APTT BLD: 42 SEC — HIGH (ref 27.5–36.3)
BUN SERPL-MCNC: 25 MG/DL — HIGH (ref 7–18)
CALCIUM SERPL-MCNC: 8.1 MG/DL — LOW (ref 8.4–10.5)
CHLORIDE SERPL-SCNC: 110 MMOL/L — HIGH (ref 96–108)
CO2 SERPL-SCNC: 29 MMOL/L — SIGNIFICANT CHANGE UP (ref 22–31)
CREAT SERPL-MCNC: 1.37 MG/DL — HIGH (ref 0.5–1.3)
CULTURE RESULTS: SIGNIFICANT CHANGE UP
CULTURE RESULTS: SIGNIFICANT CHANGE UP
GLUCOSE SERPL-MCNC: 99 MG/DL — SIGNIFICANT CHANGE UP (ref 70–99)
INR BLD: 2.59 RATIO — HIGH (ref 0.88–1.16)
POTASSIUM SERPL-MCNC: 3.8 MMOL/L — SIGNIFICANT CHANGE UP (ref 3.5–5.3)
POTASSIUM SERPL-SCNC: 3.8 MMOL/L — SIGNIFICANT CHANGE UP (ref 3.5–5.3)
PROTHROM AB SERPL-ACNC: 29.6 SEC — HIGH (ref 10–12.9)
SODIUM SERPL-SCNC: 144 MMOL/L — SIGNIFICANT CHANGE UP (ref 135–145)
SPECIMEN SOURCE: SIGNIFICANT CHANGE UP
SPECIMEN SOURCE: SIGNIFICANT CHANGE UP

## 2019-05-07 PROCEDURE — 71250 CT THORAX DX C-: CPT | Mod: 26

## 2019-05-07 RX ORDER — METOPROLOL TARTRATE 50 MG
50 TABLET ORAL
Qty: 0 | Refills: 0 | Status: DISCONTINUED | OUTPATIENT
Start: 2019-05-07 | End: 2019-05-09

## 2019-05-07 RX ORDER — WARFARIN SODIUM 2.5 MG/1
1 TABLET ORAL ONCE
Qty: 0 | Refills: 0 | Status: COMPLETED | OUTPATIENT
Start: 2019-05-07 | End: 2019-05-07

## 2019-05-07 RX ORDER — WARFARIN SODIUM 2.5 MG/1
2 TABLET ORAL ONCE
Qty: 0 | Refills: 0 | Status: DISCONTINUED | OUTPATIENT
Start: 2019-05-07 | End: 2019-05-07

## 2019-05-07 RX ADMIN — Medication 400 UNIT(S): at 13:05

## 2019-05-07 RX ADMIN — Medication 3 MILLILITER(S): at 14:16

## 2019-05-07 RX ADMIN — Medication 50 MILLIGRAM(S): at 13:05

## 2019-05-07 RX ADMIN — Medication 25 MILLIGRAM(S): at 05:56

## 2019-05-07 RX ADMIN — Medication 81 MILLIGRAM(S): at 13:04

## 2019-05-07 RX ADMIN — Medication 3 MILLILITER(S): at 03:03

## 2019-05-07 RX ADMIN — Medication 3 MILLILITER(S): at 09:17

## 2019-05-07 RX ADMIN — Medication 20 MILLIGRAM(S): at 05:56

## 2019-05-07 RX ADMIN — WARFARIN SODIUM 1 MILLIGRAM(S): 2.5 TABLET ORAL at 21:28

## 2019-05-07 RX ADMIN — FINASTERIDE 5 MILLIGRAM(S): 5 TABLET, FILM COATED ORAL at 13:04

## 2019-05-07 RX ADMIN — CARBIDOPA AND LEVODOPA 1 TABLET(S): 25; 100 TABLET ORAL at 13:04

## 2019-05-07 RX ADMIN — TAMSULOSIN HYDROCHLORIDE 0.4 MILLIGRAM(S): 0.4 CAPSULE ORAL at 21:28

## 2019-05-07 RX ADMIN — Medication 3 MILLILITER(S): at 21:06

## 2019-05-07 RX ADMIN — ATORVASTATIN CALCIUM 40 MILLIGRAM(S): 80 TABLET, FILM COATED ORAL at 21:28

## 2019-05-07 RX ADMIN — LISINOPRIL 2.5 MILLIGRAM(S): 2.5 TABLET ORAL at 17:25

## 2019-05-07 RX ADMIN — SPIRONOLACTONE 25 MILLIGRAM(S): 25 TABLET, FILM COATED ORAL at 05:56

## 2019-05-07 RX ADMIN — Medication 50 MILLIGRAM(S): at 17:24

## 2019-05-07 RX ADMIN — PIPERACILLIN AND TAZOBACTAM 25 GRAM(S): 4; .5 INJECTION, POWDER, LYOPHILIZED, FOR SOLUTION INTRAVENOUS at 05:58

## 2019-05-07 RX ADMIN — PIPERACILLIN AND TAZOBACTAM 25 GRAM(S): 4; .5 INJECTION, POWDER, LYOPHILIZED, FOR SOLUTION INTRAVENOUS at 13:05

## 2019-05-07 RX ADMIN — LISINOPRIL 2.5 MILLIGRAM(S): 2.5 TABLET ORAL at 05:57

## 2019-05-07 RX ADMIN — PIPERACILLIN AND TAZOBACTAM 25 GRAM(S): 4; .5 INJECTION, POWDER, LYOPHILIZED, FOR SOLUTION INTRAVENOUS at 21:28

## 2019-05-07 NOTE — PROGRESS NOTE ADULT - PROBLEM SELECTOR PLAN 3
On coumadin 3 at home  - P/w supratherapeutic INR on admission to 8.3, no evidence of bleeding, may be ?2/2 recent abx  - INR improving, will give coumadin 2 tonight  - F/u INR daily  - Cardio Dr Henley On coumadin 3 at home  - P/w supratherapeutic INR on admission to 8.3, no evidence of bleeding, may be ?2/2 recent abx  - INR improving, will give coumadin 1 tonight  - F/u INR daily  - Cardio Dr Henley

## 2019-05-07 NOTE — PROGRESS NOTE ADULT - SUBJECTIVE AND OBJECTIVE BOX
CHIEF COMPLAINT:Patient is a 84y old  Male who presents with a chief complaint of shortness of breath.Pt appears comfortable, o NC now.    	  REVIEW OF SYSTEMS:  CONSTITUTIONAL: No fever, weight loss, or fatigue  EYES: No eye pain, visual disturbances, or discharge  ENT:  No difficulty hearing, tinnitus, vertigo; No sinus or throat pain  NECK: No pain or stiffness  RESPIRATORY: No cough, wheezing, chills or hemoptysis; No Shortness of Breath  CARDIOVASCULAR: No chest pain, palpitations, passing out, dizziness, or leg swelling  GASTROINTESTINAL: No abdominal or epigastric pain. No nausea, vomiting, or hematemesis; No diarrhea or constipation. No melena or hematochezia.  GENITOURINARY: No dysuria, frequency, hematuria, or incontinence  NEUROLOGICAL: No headaches, memory loss, loss of strength, numbness, or tremors  SKIN: No itching, burning, rashes, or lesions   LYMPH Nodes: No enlarged glands  ENDOCRINE: No heat or cold intolerance; No hair loss  MUSCULOSKELETAL: No joint pain or swelling; No muscle, back, or extremity pain  PSYCHIATRIC: No depression, anxiety, mood swings, or difficulty sleeping  HEME/LYMPH: No easy bruising, or bleeding gums  ALLERGY AND IMMUNOLOGIC: No hives or eczema	      PHYSICAL EXAM:  T(C): 36.2 (05-07-19 @ 05:48), Max: 36.9 (05-06-19 @ 21:12)  HR: 90 (05-07-19 @ 09:36) (74 - 98)  BP: 150/78 (05-07-19 @ 05:48) (134/71 - 150/78)  RR: 16 (05-07-19 @ 06:15) (16 - 18)  SpO2: 95% (05-07-19 @ 09:36) (91% - 97%)  Wt(kg): --  I&O's Summary    06 May 2019 07:01  -  07 May 2019 07:00  --------------------------------------------------------  IN: 0 mL / OUT: 1500 mL / NET: -1500 mL        Appearance: Normal	  HEENT:   Normal oral mucosa, PERRL, EOMI	  Lymphatic: No lymphadenopathy  Cardiovascular: Normal S1 S2, + JVD  Respiratory: Dec bS at bases	  Psychiatry: A & O x 3, Mood & affect appropriate  Gastrointestinal:  Soft, Non-tender, + BS	  Skin: No rashes, No ecchymoses, No cyanosis	  Neurologic: Non-focal  Extremities: Normal range of motion, No clubbing, cyanosis or edema  Vascular: Peripheral pulses palpable 2+ bilaterally    MEDICATIONS  (STANDING):  ALBUTerol/ipratropium for Nebulization 3 milliLiter(s) Nebulizer every 6 hours  aspirin  chewable 81 milliGRAM(s) Oral daily  atorvastatin 40 milliGRAM(s) Oral at bedtime  carbidopa/levodopa  25/100 1 Tablet(s) Oral daily  cholecalciferol 400 Unit(s) Oral daily  digoxin     Tablet 0.125 milliGRAM(s) Oral every other day  finasteride 5 milliGRAM(s) Oral daily  furosemide   Injectable 20 milliGRAM(s) IV Push daily  lisinopril 2.5 milliGRAM(s) Oral two times a day  metoprolol tartrate 25 milliGRAM(s) Oral every 8 hours  piperacillin/tazobactam IVPB. 3.375 Gram(s) IV Intermittent every 8 hours  pyridoxine 50 milliGRAM(s) Oral daily  spironolactone 25 milliGRAM(s) Oral daily  tamsulosin 0.4 milliGRAM(s) Oral at bedtime  warfarin 2 milliGRAM(s) Oral once        	  LABS:	 	                        11.6   7.83  )-----------( 173      ( 06 May 2019 06:44 )             36.1     05-07    144  |  110<H>  |  25<H>  ----------------------------<  99  3.8   |  29  |  1.37<H>    Ca    8.1<L>      07 May 2019 07:43      proBNP: Serum Pro-Brain Natriuretic Peptide: 5858 pg/mL (05-02 @ 01:39)    Lipid Profile: Cholesterol <50  LDL <17  HDL 27  TG 30    HgA1c: Hemoglobin A1C, Whole Blood: 6.1 % (05-02 @ 14:38)    TSH: Thyroid Stimulating Hormone, Serum: 0.21 uU/mL (05-02 @ 09:55)      	  PT/INR - ( 07 May 2019 07:43 )   PT: 29.6 sec;   INR: 2.59 ratio         PTT - ( 07 May 2019 07:43 )  PTT:42.0 sec      EXAM:  XR CHEST PORTABLE ROUTINE 1V                            PROCEDURE DATE:  05/06/2019          INTERPRETATION:  AP semisupine chest on May 6, 2019 at 10:56 AM. Patient   is hypoxic.    Heart size is likely enlarged.    On May 2 there was a consolidation laterally in the left midlung field.    Left lung findings are markedly increased now with opacification of the   left lower lung field likely a combination of atelectasis and effusion.    IMPRESSION: Increasing left lower lung field findings as above. Heart   enlargement again suspect.

## 2019-05-07 NOTE — PROGRESS NOTE ADULT - SUBJECTIVE AND OBJECTIVE BOX
PGY1 Note discussed with supervising resident and primary attending.    Patient is a 84y old  Male who presents with a chief complaint of shortness of breath (07 May 2019 10:48)      INTERVAL HPI/OVERNIGHT EVENTS: Patient removed the HFNC following which he was saturating 92% on room air, switched to NC, saturating well. Assessed bedside this AM, reports no complaints.     MEDICATIONS  (STANDING):  ALBUTerol/ipratropium for Nebulization 3 milliLiter(s) Nebulizer every 6 hours  aspirin  chewable 81 milliGRAM(s) Oral daily  atorvastatin 40 milliGRAM(s) Oral at bedtime  carbidopa/levodopa  25/100 1 Tablet(s) Oral daily  cholecalciferol 400 Unit(s) Oral daily  digoxin     Tablet 0.125 milliGRAM(s) Oral every other day  finasteride 5 milliGRAM(s) Oral daily  furosemide   Injectable 20 milliGRAM(s) IV Push daily  lisinopril 2.5 milliGRAM(s) Oral two times a day  metoprolol tartrate 50 milliGRAM(s) Oral two times a day  piperacillin/tazobactam IVPB. 3.375 Gram(s) IV Intermittent every 8 hours  pyridoxine 50 milliGRAM(s) Oral daily  spironolactone 25 milliGRAM(s) Oral daily  tamsulosin 0.4 milliGRAM(s) Oral at bedtime  warfarin 1 milliGRAM(s) Oral once    MEDICATIONS  (PRN):      Allergies    No Known Allergies    Intolerances    REVIEW OF SYSTEMS:  CONSTITUTIONAL: No fever, or fatigue  RESPIRATORY: No cough, wheezing; shortness of breath better on NC  CARDIOVASCULAR: No chest pain, palpitations, leg swelling  GASTROINTESTINAL: No abdominal pain. No nausea, vomiting, diarrhea or constipation.  NEUROLOGICAL: No headaches, tremors  SKIN: No itching, burning, rashes, or lesions     Vital Signs Last 24 Hrs  T(C): 36.6 (07 May 2019 11:18), Max: 36.9 (06 May 2019 21:12)  T(F): 97.9 (07 May 2019 11:18), Max: 98.4 (06 May 2019 21:12)  HR: 103 (07 May 2019 11:18) (74 - 103)  BP: 130/76 (07 May 2019 11:18) (130/76 - 150/78)  BP(mean): 86 (06 May 2019 23:07) (86 - 86)  RR: 17 (07 May 2019 11:18) (16 - 18)  SpO2: 94% (07 May 2019 11:18) (91% - 97%)    PHYSICAL EXAM:  GENERAL: NAD, well-groomed, well-developed, on NC  CHEST/LUNG: Clear to auscultation  HEART: Regular rate and rhythm; No murmurs, rubs, or gallops  ABDOMEN: Soft, Nontender, Nondistended; Bowel sounds present  NERVOUS SYSTEM:  Awake & alert, no focal deficits  EXTREMITIES:  2+ Peripheral Pulses, No clubbing, cyanosis, or edema    LABS:                        11.6   7.83  )-----------( 173      ( 06 May 2019 06:44 )             36.1     05-07    144  |  110<H>  |  25<H>  ----------------------------<  99  3.8   |  29  |  1.37<H>    Ca    8.1<L>      07 May 2019 07:43      PT/INR - ( 07 May 2019 07:43 )   PT: 29.6 sec;   INR: 2.59 ratio         PTT - ( 07 May 2019 07:43 )  PTT:42.0 sec      Consultant(s) Notes Reviewed:  [ x ] YES  [ ] NO

## 2019-05-07 NOTE — PROGRESS NOTE ADULT - ASSESSMENT
-HCAP-left   -elevated troponin-2/2 demand ischemia  - resolved supertx INR  -low ef-ef 15-20%,grade 3 diastolic dysfct.  -HX; CAD,AF,HTN,CHF,HLD,PVD CVA,DEMENTIA, PD, CKD,BPH    PLAN; ZOSYN           F/U INR-COUMADIN           CARDIOF/U -ACEI/LASIX/B BLOCKER/DIG           DYSPHAGIA DIET -HCAP-left   -elevated troponin-2/2 demand ischemia  - resolved supertx INR  -low ef-ef 15-20%,grade 3 diastolic dysfct.  -HX; CAD,AF,HTN,CHF,HLD,PVD CVA,DEMENTIA, PD, CKD,BPH    PLAN; ZOSYN           F/U INR-COUMADIN           CARDIOF/U -ACEI/LASIX/B BLOCKER/DIG           DYSPHAGIA DIET           PT

## 2019-05-07 NOTE — PROGRESS NOTE ADULT - PROBLEM SELECTOR PLAN 7
-On lopressor 25 q8h for tachy + afib  -started low dose acei  -monitor BP -On lopressor for tachy + afib  -lopressor dose changes to 100mg bid  -started low dose acei  -monitor BP

## 2019-05-07 NOTE — PROGRESS NOTE ADULT - SUBJECTIVE AND OBJECTIVE BOX
Patient is a 84y old  Male who presents with a chief complaint of shortness of breath (07 May 2019 12:11)      INTERVAL HPI/OVERNIGHT EVENTS:  now on nasal 02, no sob, + cough    VITAL SIGNS:  T(F): 97.9 (05-07-19 @ 11:18)  HR: 103 (05-07-19 @ 11:18)  BP: 130/76 (05-07-19 @ 11:18)  RR: 17 (05-07-19 @ 11:18)  SpO2: 94% (05-07-19 @ 11:18)  Wt(kg): --  I&O's Detail    06 May 2019 07:01  -  07 May 2019 07:00  --------------------------------------------------------  IN:  Total IN: 0 mL    OUT:    Indwelling Catheter - Urethral: 1500 mL  Total OUT: 1500 mL    Total NET: -1500 mL      07 May 2019 07:01  -  07 May 2019 12:24  --------------------------------------------------------  IN:  Total IN: 0 mL    OUT:    Indwelling Catheter - Urethral: 900 mL  Total OUT: 900 mL    Total NET: -900 mL              REVIEW OF SYSTEMS:    CONSTITUTIONAL:  No fevers, chills, sweats    HEENT:  Eyes:  No diplopia or blurred vision. ENT:  No earache, sore throat or runny nose.    CARDIOVASCULAR:  No pressure, squeezing, tightness, or heaviness about the chest; no palpitations.    RESPIRATORY:  Per HPI    GASTROINTESTINAL:  No abdominal pain, nausea, vomiting or diarrhea.    GENITOURINARY:  No dysuria, frequency or urgency.    NEUROLOGIC:  No paresthesias, fasciculations, seizures or weakness.    PSYCHIATRIC:  No disorder of thought or mood.      PHYSICAL EXAM:    Constitutional:no complaints  ENMT:atnc  Neck:supple  Respiratory: creps on left  Cardiovascular:irreg  Gastrointestinal:soft  Extremities:no edema  Vascular:intact  Neurological:non focal  Musculoskeletal:no edema, normal rom    MEDICATIONS  (STANDING):  ALBUTerol/ipratropium for Nebulization 3 milliLiter(s) Nebulizer every 6 hours  aspirin  chewable 81 milliGRAM(s) Oral daily  atorvastatin 40 milliGRAM(s) Oral at bedtime  carbidopa/levodopa  25/100 1 Tablet(s) Oral daily  cholecalciferol 400 Unit(s) Oral daily  digoxin     Tablet 0.125 milliGRAM(s) Oral every other day  finasteride 5 milliGRAM(s) Oral daily  furosemide   Injectable 20 milliGRAM(s) IV Push daily  lisinopril 2.5 milliGRAM(s) Oral two times a day  metoprolol tartrate 50 milliGRAM(s) Oral two times a day  piperacillin/tazobactam IVPB. 3.375 Gram(s) IV Intermittent every 8 hours  pyridoxine 50 milliGRAM(s) Oral daily  spironolactone 25 milliGRAM(s) Oral daily  tamsulosin 0.4 milliGRAM(s) Oral at bedtime  warfarin 1 milliGRAM(s) Oral once    MEDICATIONS  (PRN):      Allergies    No Known Allergies    Intolerances        LABS:                        11.6   7.83  )-----------( 173      ( 06 May 2019 06:44 )             36.1     05-07    144  |  110<H>  |  25<H>  ----------------------------<  99  3.8   |  29  |  1.37<H>    Ca    8.1<L>      07 May 2019 07:43      PT/INR - ( 07 May 2019 07:43 )   PT: 29.6 sec;   INR: 2.59 ratio         PTT - ( 07 May 2019 07:43 )  PTT:42.0 sec          CAPILLARY BLOOD GLUCOSE        pro-bnp [id number] 05-02 @ 01:39     d-dimer --  05-02 @ 01:39      RADIOLOGY & ADDITIONAL TESTS:    Ct scan chest:  EXAM:  CT CHEST                            PROCEDURE DATE:  05/07/2019          INTERPRETATION:  CLINICAL INFORMATION: Shortness of breath.    COMPARISON: Chest radiograph 5/16/2019.    PROCEDURE:   CT of the Chest was performed without intravenouscontrast.  Sagittal and coronal reformats were performed.      FINDINGS:    CHEST:     LUNGS AND LARGE AIRWAYS: Patent central airways. Central left upper lobe   airspace opacities. Bilateral lower lobe calcified granulomas.  PLEURA: Small bilateral pleural effusions.  VESSELS: Atherosclerotic calcifications.  HEART: Cardiomegaly. Coronary arterial calcifications. No pericardial   effusion.  MEDIASTINUM AND ARUNA: No lymphadenopathy.  CHEST WALL AND LOWER NECK: Within normal limits.  VISUALIZED UPPER ABDOMEN: Within normal limits.  BONES: Degenerative changes.    IMPRESSION: Central left upper lobe airspace opacities which may   represent focal pulmonary edema or infection.  Small bilateral pleural effusions.

## 2019-05-07 NOTE — PROGRESS NOTE ADULT - PROBLEM SELECTOR PLAN 1
P/w fever 102.4, , leukocytosis 16, encephalopathy, and hypoxia  - CXR showing opacity of left upper lobe  - Likely complicated pneumonia as patient is from NH  - Failed outpatient PO abx in NH  - S/p vanc + cefepime + 2L NS bolus in ED  - C/w zosyn for complex pneumonia  - Originally on bipap,switched from HFNC to NC, saturating well  - Blood cx ngtd  - Strep and RVP negative  - f/u CT chest   - ID consult Dr Velez

## 2019-05-07 NOTE — PROGRESS NOTE ADULT - PROBLEM SELECTOR PLAN 10
IMPROVE VTE Individual Risk Assessment          RISK                                                          Points  [  ] Previous VTE                                                3  [  ] Thrombophilia                                             2  [  ] Lower limb paralysis                                   2        (unable to hold up >15 seconds)    [  ] Current Cancer                                             2         (within 6 months)  [ x ] Immobilization > 24 hrs                              1  [  ] ICU/CCU stay > 24 hours                             1  [ x ] Age > 60                                                         1    IMPROVE VTE Score: 2  Coumadin for afib, will give 2 today  F/u INR daily IMPROVE VTE Individual Risk Assessment          RISK                                                          Points  [  ] Previous VTE                                                3  [  ] Thrombophilia                                             2  [  ] Lower limb paralysis                                   2        (unable to hold up >15 seconds)    [  ] Current Cancer                                             2         (within 6 months)  [ x ] Immobilization > 24 hrs                              1  [  ] ICU/CCU stay > 24 hours                             1  [ x ] Age > 60                                                         1    IMPROVE VTE Score: 2  Coumadin for afib

## 2019-05-07 NOTE — PROGRESS NOTE ADULT - ASSESSMENT
84 year old male PMH CAD, CVA, CKD, BPH, Parkinson's, dementia, Afib on coumadin, HTN, CHF, PVD, HLD was brought in by ambulance from [facility] for shortness of breath,Lt sided pneumonia, elevated INR, and + troponis due to demand ischemia-Type II MI,acute systolic HF.  1.Tele monitoring.  2.ABX.  3.Afib-dig .125mg qd,inc lopressor 50mg q12h, coumadin INR 2-3.  4.CHF-IV lasix, aldactone, low dose ace.  5.CAD-asa,b blocker,statin.  6.CVA-coumadin,statin.  7.BPH-flomax,proscar.

## 2019-05-08 ENCOUNTER — TRANSCRIPTION ENCOUNTER (OUTPATIENT)
Age: 84
End: 2019-05-08

## 2019-05-08 LAB
ANION GAP SERPL CALC-SCNC: 7 MMOL/L — SIGNIFICANT CHANGE UP (ref 5–17)
APTT BLD: 40.5 SEC — HIGH (ref 27.5–36.3)
BASE EXCESS BLDA CALC-SCNC: 6.7 MMOL/L — HIGH (ref -2–2)
BLOOD GAS COMMENTS ARTERIAL: SIGNIFICANT CHANGE UP
BLOOD GAS COMMENTS ARTERIAL: SIGNIFICANT CHANGE UP
BUN SERPL-MCNC: 27 MG/DL — HIGH (ref 7–18)
CALCIUM SERPL-MCNC: 8.5 MG/DL — SIGNIFICANT CHANGE UP (ref 8.4–10.5)
CHLORIDE SERPL-SCNC: 109 MMOL/L — HIGH (ref 96–108)
CO2 SERPL-SCNC: 27 MMOL/L — SIGNIFICANT CHANGE UP (ref 22–31)
CREAT SERPL-MCNC: 1.24 MG/DL — SIGNIFICANT CHANGE UP (ref 0.5–1.3)
GLUCOSE BLDC GLUCOMTR-MCNC: 122 MG/DL — HIGH (ref 70–99)
GLUCOSE SERPL-MCNC: 95 MG/DL — SIGNIFICANT CHANGE UP (ref 70–99)
HCO3 BLDA-SCNC: 30 MMOL/L — HIGH (ref 23–27)
HOROWITZ INDEX BLDA+IHG-RTO: 32 — SIGNIFICANT CHANGE UP
INR BLD: 2.49 RATIO — HIGH (ref 0.88–1.16)
PCO2 BLDA: 39 MMHG — SIGNIFICANT CHANGE UP (ref 32–46)
PH BLDA: 7.5 — HIGH (ref 7.35–7.45)
PO2 BLDA: 79 MMHG — SIGNIFICANT CHANGE UP (ref 74–108)
POTASSIUM SERPL-MCNC: 3.9 MMOL/L — SIGNIFICANT CHANGE UP (ref 3.5–5.3)
POTASSIUM SERPL-SCNC: 3.9 MMOL/L — SIGNIFICANT CHANGE UP (ref 3.5–5.3)
PROTHROM AB SERPL-ACNC: 28.4 SEC — HIGH (ref 10–12.9)
SAO2 % BLDA: 96 % — SIGNIFICANT CHANGE UP (ref 92–96)
SODIUM SERPL-SCNC: 143 MMOL/L — SIGNIFICANT CHANGE UP (ref 135–145)

## 2019-05-08 PROCEDURE — 70450 CT HEAD/BRAIN W/O DYE: CPT | Mod: 26

## 2019-05-08 RX ORDER — WARFARIN SODIUM 2.5 MG/1
2 TABLET ORAL ONCE
Qty: 0 | Refills: 0 | Status: COMPLETED | OUTPATIENT
Start: 2019-05-08 | End: 2019-05-08

## 2019-05-08 RX ADMIN — Medication 400 UNIT(S): at 13:30

## 2019-05-08 RX ADMIN — Medication 50 MILLIGRAM(S): at 13:30

## 2019-05-08 RX ADMIN — Medication 81 MILLIGRAM(S): at 13:30

## 2019-05-08 RX ADMIN — LISINOPRIL 2.5 MILLIGRAM(S): 2.5 TABLET ORAL at 17:25

## 2019-05-08 RX ADMIN — PIPERACILLIN AND TAZOBACTAM 25 GRAM(S): 4; .5 INJECTION, POWDER, LYOPHILIZED, FOR SOLUTION INTRAVENOUS at 13:29

## 2019-05-08 RX ADMIN — ATORVASTATIN CALCIUM 40 MILLIGRAM(S): 80 TABLET, FILM COATED ORAL at 21:54

## 2019-05-08 RX ADMIN — Medication 3 MILLILITER(S): at 15:46

## 2019-05-08 RX ADMIN — WARFARIN SODIUM 2 MILLIGRAM(S): 2.5 TABLET ORAL at 21:54

## 2019-05-08 RX ADMIN — Medication 50 MILLIGRAM(S): at 17:25

## 2019-05-08 RX ADMIN — TAMSULOSIN HYDROCHLORIDE 0.4 MILLIGRAM(S): 0.4 CAPSULE ORAL at 21:55

## 2019-05-08 RX ADMIN — Medication 3 MILLILITER(S): at 21:48

## 2019-05-08 RX ADMIN — LISINOPRIL 2.5 MILLIGRAM(S): 2.5 TABLET ORAL at 05:41

## 2019-05-08 RX ADMIN — Medication 20 MILLIGRAM(S): at 05:41

## 2019-05-08 RX ADMIN — PIPERACILLIN AND TAZOBACTAM 25 GRAM(S): 4; .5 INJECTION, POWDER, LYOPHILIZED, FOR SOLUTION INTRAVENOUS at 21:55

## 2019-05-08 RX ADMIN — Medication 50 MILLIGRAM(S): at 05:41

## 2019-05-08 RX ADMIN — Medication 3 MILLILITER(S): at 09:38

## 2019-05-08 RX ADMIN — SPIRONOLACTONE 25 MILLIGRAM(S): 25 TABLET, FILM COATED ORAL at 05:41

## 2019-05-08 RX ADMIN — PIPERACILLIN AND TAZOBACTAM 25 GRAM(S): 4; .5 INJECTION, POWDER, LYOPHILIZED, FOR SOLUTION INTRAVENOUS at 05:41

## 2019-05-08 RX ADMIN — FINASTERIDE 5 MILLIGRAM(S): 5 TABLET, FILM COATED ORAL at 13:30

## 2019-05-08 RX ADMIN — Medication 0.12 MILLIGRAM(S): at 13:30

## 2019-05-08 RX ADMIN — CARBIDOPA AND LEVODOPA 1 TABLET(S): 25; 100 TABLET ORAL at 13:30

## 2019-05-08 NOTE — PROGRESS NOTE ADULT - PROBLEM SELECTOR PLAN 3
On coumadin 3 at home  - P/w supratherapeutic INR on admission to 8.3, no evidence of bleeding, may be ?2/2 recent abx  - F/u INR daily, coumadin accordingly  - Cardio Dr Henley

## 2019-05-08 NOTE — PROGRESS NOTE ADULT - PROBLEM SELECTOR PLAN 10
IMPROVE VTE Individual Risk Assessment          RISK                                                          Points  [  ] Previous VTE                                                3  [  ] Thrombophilia                                             2  [  ] Lower limb paralysis                                   2        (unable to hold up >15 seconds)    [  ] Current Cancer                                             2         (within 6 months)  [ x ] Immobilization > 24 hrs                              1  [  ] ICU/CCU stay > 24 hours                             1  [ x ] Age > 60                                                         1    IMPROVE VTE Score: 2  Coumadin for afib

## 2019-05-08 NOTE — PROGRESS NOTE ADULT - PROBLEM SELECTOR PLAN 7
-On lopressor for tachy + afib  -lopressor dose changed to 100mg bid  -started low dose acei  -monitor BP

## 2019-05-08 NOTE — PROGRESS NOTE ADULT - ASSESSMENT
84 year old male PMH CAD, CVA, CKD, BPH, Parkinson's, dementia, Afib on coumadin, HTN, CHF, PVD, HLD was brought in by ambulance from [facility] for shortness of breath,Lt sided pneumonia, elevated INR, and + troponins due to demand ischemia-Type II MI,acute systolic HF,now lethargic.  1.Tele monitoring.  2.ABX.  3.Afib-dig .125mg qd, lopressor 50mg q12h, coumadin INR 2-3.  4.CHF-IV lasix, aldactone, low dose ace.  5.CAD-asa,b blocker,statin.  6.CVA-coumadin,statin.  7.BPH-flomax,proscar.  8.Stat CT head ordered.

## 2019-05-08 NOTE — PROGRESS NOTE ADULT - SUBJECTIVE AND OBJECTIVE BOX
PGY1 Note discussed with supervising resident and primary attending.    Patient is a 84y old  Male who presents with a chief complaint of shortness of breath (08 May 2019 11:38)      INTERVAL HPI/OVERNIGHT EVENTS: patient assessed bedside this AM offered no complaints. However noted to be lethargic around 10 am, ct head done, shows no acute changes.     MEDICATIONS  (STANDING):  ALBUTerol/ipratropium for Nebulization 3 milliLiter(s) Nebulizer every 6 hours  aspirin  chewable 81 milliGRAM(s) Oral daily  atorvastatin 40 milliGRAM(s) Oral at bedtime  carbidopa/levodopa  25/100 1 Tablet(s) Oral daily  cholecalciferol 400 Unit(s) Oral daily  digoxin     Tablet 0.125 milliGRAM(s) Oral every other day  finasteride 5 milliGRAM(s) Oral daily  furosemide   Injectable 20 milliGRAM(s) IV Push daily  lisinopril 2.5 milliGRAM(s) Oral two times a day  metoprolol tartrate 50 milliGRAM(s) Oral two times a day  piperacillin/tazobactam IVPB. 3.375 Gram(s) IV Intermittent every 8 hours  pyridoxine 50 milliGRAM(s) Oral daily  spironolactone 25 milliGRAM(s) Oral daily  tamsulosin 0.4 milliGRAM(s) Oral at bedtime    MEDICATIONS  (PRN):      Allergies    No Known Allergies    Intolerances      REVIEW OF SYSTEMS:  CONSTITUTIONAL: No fever, or fatigue  RESPIRATORY: No cough, wheezing; shortness of breath better on NC  CARDIOVASCULAR: No chest pain, palpitations, leg swelling  GASTROINTESTINAL: No abdominal pain. No nausea, vomiting, diarrhea or constipation.  NEUROLOGICAL: No headaches, tremors  SKIN: No itching, burning, rashes, or lesions     Vital Signs Last 24 Hrs  T(C): 36.2 (08 May 2019 05:23), Max: 36.9 (07 May 2019 21:31)  T(F): 97.1 (08 May 2019 05:23), Max: 98.4 (07 May 2019 21:31)  HR: 89 (08 May 2019 10:28) (83 - 127)  BP: 159/83 (08 May 2019 10:28) (134/85 - 163/82)  BP(mean): --  RR: 18 (08 May 2019 05:23) (17 - 18)  SpO2: 97% (08 May 2019 10:28) (94% - 98%)    PHYSICAL EXAM:  GENERAL: NAD, well-groomed, well-developed, on NC  CHEST/LUNG: Clear to auscultation  HEART: Regular rate and rhythm; No murmurs, rubs, or gallops  ABDOMEN: Soft, Nontender, Nondistended; Bowel sounds present  NERVOUS SYSTEM:  Awake & alert, no focal deficits  EXTREMITIES:  2+ Peripheral Pulses, No clubbing, cyanosis, or edema    LABS:    05-08    143  |  109<H>  |  27<H>  ----------------------------<  95  3.9   |  27  |  1.24    Ca    8.5      08 May 2019 06:12      PT/INR - ( 08 May 2019 06:12 )   PT: 28.4 sec;   INR: 2.49 ratio         PTT - ( 08 May 2019 06:12 )  PTT:40.5 sec    CAPILLARY BLOOD GLUCOSE      POCT Blood Glucose.: 122 mg/dL (08 May 2019 10:48)      RADIOLOGY & ADDITIONAL TESTS: < from: CT Head No Cont (05.08.19 @ 11:47) >    EXAM:  CT BRAIN                            PROCEDURE DATE:  05/08/2019          INTERPRETATION:  HISTORY: Lethargy    There is moderate motion induced artifact    Evaluation demonstrates no evidence of mass-effect or midline shift. No   intraparenchymal mass lesions or hemorrhage is identified. There is   generalized age typical  atrophy and chronic white matter degeneration.   There is a small chronic lacunar infarct in the right basal ganglia.   There is no evidence of hydrocephalus. No extra-axial collections are   noted.    The bone windows demonstrate no gross osseous abnormalities.    Impression:  1. no acute findings.      Imaging Personally Reviewed:  [ X ] YES  [ ] NO    Consultant(s) Notes Reviewed:  [ X ] YES  [ ] NO

## 2019-05-08 NOTE — DISCHARGE NOTE PROVIDER - NSDCFUADDINST_GEN_ALL_CORE_FT
Follow up with Dr Gomez in 5 days to monitor progress,   Get a repeat chest Xray in 1-2 weeks. Follow up with Dr Gomez in 5 days to monitor progress,   Get a repeat chest Xray in 1-2 weeks.  Trial of Void at the facility as pt failed in the hospital

## 2019-05-08 NOTE — DISCHARGE NOTE PROVIDER - CARE PROVIDER_API CALL
Marlon Vuong)  Internal Medicine  [address] [place] [address]  Phone: [phone]  Fax: [phone]  Follow Up Time:

## 2019-05-08 NOTE — PROGRESS NOTE ADULT - SUBJECTIVE AND OBJECTIVE BOX
CHIEF COMPLAINT:Patient is a 84y old  Male who presents with a chief complaint of shortness of breath.Pt is lethargic but arousable,.    	  REVIEW OF SYSTEMS:  unable to obtain    PHYSICAL EXAM:  T(C): 36.2 (05-08-19 @ 05:23), Max: 36.9 (05-07-19 @ 21:31)  HR: 89 (05-08-19 @ 10:28) (83 - 127)  BP: 159/83 (05-08-19 @ 10:28) (134/85 - 163/82)  RR: 18 (05-08-19 @ 05:23) (17 - 18)  SpO2: 97% (05-08-19 @ 10:28) (94% - 98%)    I&O's Summary    07 May 2019 07:01  -  08 May 2019 07:00  --------------------------------------------------------  IN: 0 mL / OUT: 2401 mL / NET: -2401 mL        Appearance: Normal	  HEENT:   Normal oral mucosa, PERRL, EOMI	  Lymphatic: No lymphadenopathy  Cardiovascular: Normal S1 S2, No JVD, No murmurs, No edema  Respiratory: Lungs clear to auscultation	  Gastrointestinal:  Soft, Non-tender, + BS	  Skin: No rashes, No ecchymoses, No cyanosis	  Extremities: Normal range of motion, No clubbing, cyanosis or edema  Vascular: Peripheral pulses palpable 2+ bilaterally    MEDICATIONS  (STANDING):  ALBUTerol/ipratropium for Nebulization 3 milliLiter(s) Nebulizer every 6 hours  aspirin  chewable 81 milliGRAM(s) Oral daily  atorvastatin 40 milliGRAM(s) Oral at bedtime  carbidopa/levodopa  25/100 1 Tablet(s) Oral daily  cholecalciferol 400 Unit(s) Oral daily  digoxin     Tablet 0.125 milliGRAM(s) Oral every other day  finasteride 5 milliGRAM(s) Oral daily  furosemide   Injectable 20 milliGRAM(s) IV Push daily  lisinopril 2.5 milliGRAM(s) Oral two times a day  metoprolol tartrate 50 milliGRAM(s) Oral two times a day  piperacillin/tazobactam IVPB. 3.375 Gram(s) IV Intermittent every 8 hours  pyridoxine 50 milliGRAM(s) Oral daily  spironolactone 25 milliGRAM(s) Oral daily  tamsulosin 0.4 milliGRAM(s) Oral at bedtime        	  LABS:	 	      05-08    143  |  109<H>  |  27<H>  ----------------------------<  95  3.9   |  27  |  1.24    Ca    8.5      08 May 2019 06:12      proBNP: Serum Pro-Brain Natriuretic Peptide: 5858 pg/mL (05-02 @ 01:39)    Lipid Profile: Cholesterol <50  LDL <17  HDL 27  TG 30    HgA1c: Hemoglobin A1C, Whole Blood: 6.1 % (05-02 @ 14:38)    TSH: Thyroid Stimulating Hormone, Serum: 0.21 uU/mL (05-02 @ 09:55)      	  PT/INR - ( 08 May 2019 06:12 )   PT: 28.4 sec;   INR: 2.49 ratio         PTT - ( 08 May 2019 06:12 )  PTT:40.5 sec      Blood Gas Profile - Arterial (05.08.19 @ 11:10)    pH, Arterial: 7.50    pCO2, Arterial: 39 mmHg    pO2, Arterial: 79 mmHg    HCO3, Arterial: 30 mmol/L    Base Excess, Arterial: 6.7 mmol/L    Oxygen Saturation, Arterial: 96 %    FIO2, Arterial: 32    Blood Gas Comments Arterial: Left Radial    EXAM:  CT CHEST                            PROCEDURE DATE:  05/07/2019          INTERPRETATION:  CLINICAL INFORMATION: Shortness of breath.    COMPARISON: Chest radiograph 5/16/2019.    PROCEDURE:   CT of the Chest was performed without intravenouscontrast.  Sagittal and coronal reformats were performed.      FINDINGS:    CHEST:     LUNGS AND LARGE AIRWAYS: Patent central airways. Central left upper lobe   airspace opacities. Bilateral lower lobe calcified granulomas.  PLEURA: Small bilateral pleural effusions.  VESSELS: Atherosclerotic calcifications.  HEART: Cardiomegaly. Coronary arterial calcifications. No pericardial   effusion.  MEDIASTINUM AND ARUNA: No lymphadenopathy.  CHEST WALL AND LOWER NECK: Within normal limits.  VISUALIZED UPPER ABDOMEN: Within normal limits.  BONES: Degenerative changes.    IMPRESSION: Central left upper lobe airspace opacities which may   represent focal pulmonary edema or infection.  Small bilateral pleural effusions.

## 2019-05-08 NOTE — DISCHARGE NOTE PROVIDER - HOSPITAL COURSE
4 year old male PMH CAD, CVA, CKD, BPH, Parkinson's, dementia, Afib on coumadin, HTN, CHF, PVD, HLD was brought in by ambulance from [facility] for shortness of breath. . unable to obtain further history from pt 2/2 dementia. According to ED provider, son states that patient at baseline follows basic commands and is confused; FULL CODE. Patient initially started on BIPAP in ED with improvement in hypoxia, transitioned to high flow nasal cannula 50% FiO2@50Lpm with O2 sat at 100%; CXR left upper lobe infiltrate.     Patient admitted for management of pneumonia, started on zosyn. Seen by Dr Rosie HAYES. Cardio Dr Henley was consulted for elevated troponins. Echo noted to be with EF 15-20%, , mod conc LVH, severely dilated LA, gr 3DD, increased Rv systolic pressure. His cardiac medicaitons were optimized: lasix, aldactone and acei. He was switched to NC from HFNC. CT chest showing TAWANA pna, to complete 10 days of antibiotics.     Patient also on dig and lopressor for afib; had supratherapeutic INR on admission for which coumadin was held initially and then resumed. 4 year old male PMH CAD, CVA, CKD, BPH, Parkinson's, dementia, Afib on coumadin, HTN, CHF, PVD, HLD was brought in by ambulance from [facility] for shortness of breath. . unable to obtain further history from pt 2/2 dementia. According to ED provider, son states that patient at baseline follows basic commands and is confused; FULL CODE. Patient initially started on BIPAP in ED with improvement in hypoxia, transitioned to high flow nasal cannula 50% FiO2@50Lpm with O2 sat at 100%; CXR left upper lobe infiltrate.     Patient admitted for management of pneumonia, started on zosyn. Seen by Dr Rosie HAYES. Cardio Dr Henley was consulted for elevated troponins. Echo noted to be with EF 15-20%, , mod conc LVH, severely dilated LA, gr 3DD, increased Rv systolic pressure. His cardiac medicaitons were optimized: lasix, aldactone and acei. Son refused further cardiac workup. He was switched to NC from HFNC. CT chest showing TAWANA pna, to complete 10 days of antibiotics.     Patient also on dig and lopressor for afib; had supratherapeutic INR on admission for which coumadin was held initially and then resumed at 2mg daily.             Patient stable for discharge back to [facility] as discussed with attending Dr Corley. To switch to po ceftin to complete 10 days of antibiotics. 4 year old male PMH CAD, CVA, CKD, BPH, Parkinson's, dementia, Afib on coumadin, HTN, CHF, PVD, HLD was brought in by ambulance from [facility] for shortness of breath. . unable to obtain further history from pt 2/2 dementia. According to ED provider, son states that patient at baseline follows basic commands and is confused; FULL CODE. Patient initially started on BIPAP in ED with improvement in hypoxia, transitioned to high flow nasal cannula 50% FiO2@50Lpm with O2 sat at 100%; CXR left upper lobe infiltrate.     Patient admitted for management of pneumonia, started on zosyn. Seen by Dr Rosie HAYES. Cardio Dr Henley was consulted for elevated troponins. Echo noted to be with EF 15-20%, , mod conc LVH, severely dilated LA, gr 3DD, increased Rv systolic pressure. His cardiac medicaitons were optimized: lasix, aldactone and acei. Son refused further cardiac workup. He was switched to NC from HFNC. CT chest showing TAWANA pna, to complete 10 days of antibiotics.     Patient also on dig and lopressor for afib; had supratherapeutic INR on admission for which coumadin was held initially and then resumed at 2mg daily.         Patient medically stable for discharge, to switch to po ceftin to complete 10 days of antibiotics. As per son's decision patient to go for LTC with hospice. Plan discussed with attending Dr Corley 4 year old male PMH CAD, CVA, CKD, BPH, Parkinson's, dementia, Afib on coumadin, HTN, CHF, PVD, HLD was brought in by ambulance from [facility] for shortness of breath. . unable to obtain further history from pt 2/2 dementia. According to ED provider, son states that patient at baseline follows basic commands and is confused; FULL CODE. Patient initially started on BIPAP in ED with improvement in hypoxia, transitioned to high flow nasal cannula 50% FiO2@50Lpm with O2 sat at 100%; CXR left upper lobe infiltrate.     Patient admitted for management of pneumonia, started on zosyn. Seen by Dr Rosie HAYES. Cardio Dr Henley was consulted for elevated troponins. Echo noted to be with EF 15-20%, , mod conc LVH, severely dilated LA, gr 3DD, increased Rv systolic pressure. His cardiac medicaitons were optimized: lasix, aldactone and acei. Son refused further cardiac workup. He was switched to NC from HFNC. CT chest showing TAWANA pna, to complete 10 days of antibiotics.     Patient also on dig and lopressor for afib; had supratherapeutic INR on admission for which coumadin was held initially and then resumed at 2mg daily. Pt failed TOV so will be discharged with Cardona catheter. TOV at NH        Patient medically stable for discharge, to switch to po ceftin to complete 10 days of antibiotics. As per son's decision patient to go for LTC with hospice. Plan discussed with attending Dr Corley 84year old male PMH CAD, CVA, CKD, BPH, Parkinson's, dementia, Afib on coumadin, HTN, CHF, PVD, HLD was brought in by ambulance from [facility] for shortness of breath. . unable to obtain further history from pt 2/2 dementia. According to ED provider, son states that patient at baseline follows basic commands and is confused; FULL CODE. Patient initially started on BIPAP in ED with improvement in hypoxia, transitioned to high flow nasal cannula 50% FiO2@50Lpm with O2 sat at 100%; CXR left upper lobe infiltrate.     Patient admitted for management of pneumonia, started on zosyn. Seen by Dr Rosie HAYES. Cardio Dr Henley was consulted for elevated troponins. Echo noted to be with EF 15-20%, , mod conc LVH, severely dilated LA, gr 3DD, increased Rv systolic pressure. His cardiac medicaitons were optimized: lasix, aldactone and acei. Son refused further cardiac workup. He was switched to NC from HFNC. CT chest showing TAWANA pna, to complete 10 days of antibiotics.     Patient also on dig and lopressor for afib; had supratherapeutic INR on admission for which coumadin was held initially and then resumed at 2mg daily. Pt failed TOV so will be discharged with Cardona catheter. TOV at NH        Patient medically stable for discharge, to switch to po ceftin to complete 10 days of antibiotics. As per son's decision patient to go for LTC with hospice. Plan discussed with attending Dr Corley

## 2019-05-08 NOTE — PROGRESS NOTE ADULT - PROBLEM SELECTOR PLAN 1
P/w fever 102.4, , leukocytosis 16, encephalopathy, and hypoxia  - CXR showing opacity of left upper lobe  - Likely complicated pneumonia as patient is from NH  - Failed outpatient PO abx in NH  - S/p vanc + cefepime + 2L NS bolus in ED  - CT Chest confirms left UL pna  - C/w zosyn for complex pneumonia d7/0  - Originally on bipap,switched from HFNC to NC, saturating well  - Blood cx ngtd; Strep and RVP negative  - ABG with pH 7.5, compensated metabolic alkalosis  - ID consult Dr Velez

## 2019-05-08 NOTE — PROGRESS NOTE ADULT - PROBLEM SELECTOR PLAN 2
Patient presented with initially elevated troponins, BNP 5k,   Echo noted with EF 15-20%, gr3 DD, moderate conc LVH, severely dilated LA, increased RV systolic pressure.   -patient on iv lasix, aldactone, acei as per cardio Dr Henley  -monitor BP, BMP

## 2019-05-08 NOTE — PROGRESS NOTE ADULT - SUBJECTIVE AND OBJECTIVE BOX
Patient is a 84y old  Male who presents with a chief complaint of shortness of breath (07 May 2019 12:24)      INTERVAL HPI/OVERNIGHT EVENTS:  no complaints-confused    VITAL SIGNS:  T(F): 97.1 (05-08-19 @ 05:23)  HR: 109 (05-08-19 @ 05:23)  BP: 154/92 (05-08-19 @ 05:23)  RR: 18 (05-08-19 @ 05:23)  SpO2: 94% (05-08-19 @ 05:23)  Wt(kg): --  I&O's Detail    07 May 2019 07:01  -  08 May 2019 07:00  --------------------------------------------------------  IN:  Total IN: 0 mL    OUT:    Indwelling Catheter - Urethral: 2400 mL    Stool: 1 mL  Total OUT: 2401 mL    Total NET: -2401 mL              REVIEW OF SYSTEMS:    CONSTITUTIONAL:  No fevers, chills, sweats    HEENT:  Eyes:  No diplopia or blurred vision. ENT:  No earache, sore throat or runny nose.    CARDIOVASCULAR:  No pressure, squeezing, tightness, or heaviness about the chest; no palpitations.    RESPIRATORY:  Per HPI    GASTROINTESTINAL:  No abdominal pain, nausea, vomiting or diarrhea.    GENITOURINARY:  No dysuria, frequency or urgency.    NEUROLOGIC:  No paresthesias, fasciculations, seizures or weakness.    PSYCHIATRIC:  No disorder of thought or mood.      PHYSICAL EXAM:    Constitutional:no complaints  ENMT:atnc  Neck:supple  Respiratory:occas rt sided crep  Cardiovascular:irreg  Gastrointestinal:soft  Extremities:no edema  Vascular:intact  Neurological:non focal  Musculoskeletal:no edema, normal rom    MEDICATIONS  (STANDING):  ALBUTerol/ipratropium for Nebulization 3 milliLiter(s) Nebulizer every 6 hours  aspirin  chewable 81 milliGRAM(s) Oral daily  atorvastatin 40 milliGRAM(s) Oral at bedtime  carbidopa/levodopa  25/100 1 Tablet(s) Oral daily  cholecalciferol 400 Unit(s) Oral daily  digoxin     Tablet 0.125 milliGRAM(s) Oral every other day  finasteride 5 milliGRAM(s) Oral daily  furosemide   Injectable 20 milliGRAM(s) IV Push daily  lisinopril 2.5 milliGRAM(s) Oral two times a day  metoprolol tartrate 50 milliGRAM(s) Oral two times a day  piperacillin/tazobactam IVPB. 3.375 Gram(s) IV Intermittent every 8 hours  pyridoxine 50 milliGRAM(s) Oral daily  spironolactone 25 milliGRAM(s) Oral daily  tamsulosin 0.4 milliGRAM(s) Oral at bedtime    MEDICATIONS  (PRN):      Allergies    No Known Allergies            LABS:    05-08    143  |  109<H>  |  27<H>  ----------------------------<  95  3.9   |  27  |  1.24    Ca    8.5      08 May 2019 06:12      PT/INR - ( 08 May 2019 06:12 )   PT: 28.4 sec;   INR: 2.49 ratio         PTT - ( 08 May 2019 06:12 )  PTT:40.5 sec            pro-bnp 5858 05-02 @ 01:39     d-dimer --  05-02 @ 01:39      RADIOLOGY & ADDITIONAL TESTS:        Ct scan chest:  EXAM:  CT CHEST                            PROCEDURE DATE:  05/07/2019          INTERPRETATION:  CLINICAL INFORMATION: Shortness of breath.    COMPARISON: Chest radiograph 5/16/2019.    PROCEDURE:   CT of the Chest was performed without intravenouscontrast.  Sagittal and coronal reformats were performed.      FINDINGS:    CHEST:     LUNGS AND LARGE AIRWAYS: Patent central airways. Central left upper lobe   airspace opacities. Bilateral lower lobe calcified granulomas.  PLEURA: Small bilateral pleural effusions.  VESSELS: Atherosclerotic calcifications.  HEART: Cardiomegaly. Coronary arterial calcifications. No pericardial   effusion.  MEDIASTINUM AND ARUNA: No lymphadenopathy.  CHEST WALL AND LOWER NECK: Within normal limits.  VISUALIZED UPPER ABDOMEN: Within normal limits.  BONES: Degenerative changes.    IMPRESSION: Central left upper lobe airspace opacities which may   represent focal pulmonary edema or infection.  Small bilateral pleural effusions.                  echo:  Patient name: MADHAV MANCINI  YOB: 1934   Age: 84 (M)   MR#: [id number]  Study Date: 5/4/2019  Location: [address] [place], [place] [address]grapher: Leroy Schwab NALLELY  Study quality: Technically good  Referring Physician:  YOBANI OSMAN MD  Blood Pressure: 118/65 mmHg  Height: 190 cm  Weight: 91 kg  BSA: 2.2 m2  ------------------------------------------------------------------------    PROCEDURE: Transthoracic echocardiogram with 2-D, M-Mode  and complete spectral and color flow Doppler.  INDICATION:Chronic ischemic heart disease, unspecified  (I25.9)  HISTORY:  ------------------------------------------------------------------------  DIMENSIONS:  Dimensions:     Normal Values:  LA:     5.6 cm    2.0 - 4.0 cm  Ao:     3.9 cm    2.0 - 3.8 cm  SEPTUM: 1.8 cm    0.6 - 1.2 cm  PWT:    1.5 cm    0.6 - 1.1 cm  LVIDd:  4.6 cm    3.0 - 5.6 cm  LVIDs:  3.8 cm    1.8 - 4.0 cm      Derived Variables:  LVMI: 150 g/m2  RWT: 0.65  Ejection Fraction Visual Estimate: 15-20 %    ------------------------------------------------------------------------  OBSERVATIONS:  Mitral Valve: Normal mitral valve. Mild mitral  regurgitation.  Aortic Root: Aortic Root: 3.9 cm.    Aortic Valve: Calcified trileaflet aortic valve with normal  opening. Mild aortic regurgitation.  Left Atrium: Severely dilated left atrium.  LA volume index  = 78 cc/m2.  Left Ventricle: Severe global left ventricular systolic  dysfunction. Moderate concentric left ventricular  hypertrophy. Grade III diastolic dysfunction.  Right Heart: Normal right atrium. Normal right ventricular  size with decreased RV systolic function (TAPSE .8cm).  There is moderate tricuspid regurgitation. There is mild  pulmonic regurgitation.  Pericardium/PleuraNormal pericardium with no pericardial  effusion. Bilateral pleural effusions.  Hemodynamic: RV systolic pressure is moderately increased  at  59 mm Hg.  ------------------------------------------------------------------------  CONCLUSIONS:  1. Normal mitral valve. Mild mitral regurgitation.  2. Calcified trileaflet aortic valve with normal opening.  Mild aortic regurgitation.  3. Aortic Root: 3.9 cm.  4. Severely dilated left atrium.  LA volume index = 78  cc/m2.  5. Moderate concentric left ventricular hypertrophy.  6. Severe global left ventricular systolic dysfunction.  7. Grade III diastolic dysfunction.  8. Normal right atrium.  9. Normal right ventricular size with decreased RV systolic  function (TAPSE .8cm).  10. RV systolic pressure is moderately increased at  59 mm  Hg.  11. There is moderate tricuspid regurgitation.  12. There is mild pulmonic regurgitation.  13. Normal pericardium with no pericardial effusion.  14. Bilateral pleural effusions.    ------------------------------------------------------------------------  Confirmed on  5/5/2019 - 09:56:39 by Alyssia Henley MD  ------------------------------------------------------------

## 2019-05-08 NOTE — PHYSICAL THERAPY INITIAL EVALUATION ADULT - GENERAL OBSERVATIONS, REHAB EVAL
Pt. received in semi-supine; w/ O2 @ 3li NC; (+) IV line, monitor and Cardona catheter. Pt. w/ Allevyn dressing on sacral area.

## 2019-05-08 NOTE — DISCHARGE NOTE PROVIDER - NSDCCPCAREPLAN_GEN_ALL_CORE_FT
PRINCIPAL DISCHARGE DIAGNOSIS  Diagnosis: Pneumonia  Assessment and Plan of Treatment: You were admitted for difficulty breathing, initially required of BiPPA, then switched to High flow nasal cannula. You were started on zosyn for left side pneumonia and were seen by ID doctor for the same. Your heart failure medications were also adjusted by cardiologist. Continue with medications as prescribed, follow up with your primary doctor in 5 days for continuity of care. You are recommended to get a repeat CT chest in 4-6 weeks to monitor your progress as well as evaluate for an underlying pathology.      SECONDARY DISCHARGE DIAGNOSES  Diagnosis: Atrial fibrillation  Assessment and Plan of Treatment: Your INR on admission was elevated for which coumadin was briefly held. Your levels are now stable, continue coumadin, digoxin and lopressor as prescibred. Follow up with your caridologist.    Diagnosis: Combined systolic and diastolic heart failure, unspecified HF chronicity  Assessment and Plan of Treatment: Your echocardiogram on admission showed pumping action of your heart being 15-20%, with impaired relaxation as well, being grade 3. You were seen by our cardiologsit in the hospital and medications adjusted appropriately. Continue with medicaiotns as prescribed, follow up with you rprimary doctor as well as cardiologist as outpatient.    Diagnosis: HTN (hypertension)  Assessment and Plan of Treatment: Your blood pressure was well maintained on medications as were adjusted for your heart failure and atrial fibrillation. Continue with medications as prescribed, monitor your blood pressure on a regular basis, and folow up with you rprimary doctor as well as cardiologist as outpatient.    Diagnosis: Chronic kidney disease (CKD)  Assessment and Plan of Treatment: Your kidney function was stable duting hospital stay. You are recommended to continue monitoring your kidney function with nephrologist as outpatient.    Diagnosis: Parkinsons  Assessment and Plan of Treatment: OCntinue with sinement at home dose for your parkinsons PRINCIPAL DISCHARGE DIAGNOSIS  Diagnosis: Pneumonia  Assessment and Plan of Treatment: You were admitted for difficulty breathing, initially required of BiPPA, then switched to High flow nasal cannula. You were started on zosyn for left side pneumonia and were seen by ID doctor for the same. Your heart failure medications were also adjusted by cardiologist. Continue with antibiotics as prescribed, follow up with your primary doctor in 5 days for continuity of care. You are recommended to get a repeat CXR in 1-2 weeks to monitor progress.      SECONDARY DISCHARGE DIAGNOSES  Diagnosis: Atrial fibrillation  Assessment and Plan of Treatment: Your INR on admission was elevated for which coumadin was briefly held. Your levels are now stable, continue coumadin, digoxin and lopressor as prescibred. Follow up with your caridologist.    Diagnosis: Combined systolic and diastolic heart failure, unspecified HF chronicity  Assessment and Plan of Treatment: Your echocardiogram on admission showed pumping action of your heart being 15-20%, with impaired relaxation as well, being grade 3. You were seen by our cardiologsit in the hospital and medications adjusted appropriately. Continue with medications as prescribed: lasix, aldactone, lisinopril and follow up with your primary doctor as well as cardiologist as outpatient.    Diagnosis: HTN (hypertension)  Assessment and Plan of Treatment: Your blood pressure was well maintained on medications as were adjusted for your heart failure and atrial fibrillation. Continue with medications as prescribed, monitor your blood pressure on a regular basis, and folow up with you rimary doctor as well as cardiologist as outpatient.    Diagnosis: Chronic kidney disease (CKD)  Assessment and Plan of Treatment: Your kidney function was stable duting hospital stay. You are recommended to continue monitoring your kidney function with nephrologist as outpatient.    Diagnosis: Parkinsons  Assessment and Plan of Treatment: Continue with sinement at home dose PRINCIPAL DISCHARGE DIAGNOSIS  Diagnosis: Pneumonia  Assessment and Plan of Treatment: You were admitted for difficulty breathing, initially required of BiPPA, then switched to High flow nasal cannula. You were started on zosyn for left side pneumonia and were seen by ID doctor for the same. Your heart failure medications were also adjusted by cardiologist. Continue with antibiotics as prescribed, follow up with your primary doctor in 5 days for continuity of care. You are recommended to get a repeat CXR in 1-2 weeks to monitor progress.      SECONDARY DISCHARGE DIAGNOSES  Diagnosis: Atrial fibrillation  Assessment and Plan of Treatment: Your INR on admission was elevated for which coumadin was briefly held. Your levels are now stable, continue coumadin, digoxin and lopressor as prescibred. Follow up with your caridologist.    Diagnosis: Combined systolic and diastolic heart failure, unspecified HF chronicity  Assessment and Plan of Treatment: Your echocardiogram on admission showed pumping action of your heart being 15-20%, with impaired relaxation as well, being grade 3. You were seen by our cardiologsit in the hospital and medications adjusted appropriately. Continue with medications as prescribed: lasix, aldactone, lisinopril and follow up with your primary doctor as well as cardiologist as outpatient.    Diagnosis: HTN (hypertension)  Assessment and Plan of Treatment: Your blood pressure was well maintained on medications as were adjusted for your heart failure and atrial fibrillation. Continue with medications as prescribed, monitor your blood pressure on a regular basis, and folow up with you rimary doctor as well as cardiologist as outpatient.    Diagnosis: Chronic kidney disease (CKD)  Assessment and Plan of Treatment: Your kidney function was stable duting hospital stay. You are recommended to continue monitoring your kidney function with nephrologist as outpatient.    Diagnosis: Debility  Assessment and Plan of Treatment: continue with physical therapy at [facility].    Diagnosis: Protein-calorie malnutrition  Assessment and Plan of Treatment: continue diet as tolerated.    Diagnosis: Parkinsons  Assessment and Plan of Treatment: Continue with sinement at home dose PRINCIPAL DISCHARGE DIAGNOSIS  Diagnosis: Pneumonia  Assessment and Plan of Treatment: You were admitted for difficulty breathing, initially required of BiPPA, then switched to High flow nasal cannula. You were started on zosyn for left side pneumonia and were seen by ID doctor for the same. Your heart failure medications were also adjusted by cardiologist. Continue with antibiotics as prescribed, follow up with your primary doctor in 5 days for continuity of care. You are recommended to get a repeat CXR in 1-2 weeks to monitor progress.      SECONDARY DISCHARGE DIAGNOSES  Diagnosis: Atrial fibrillation  Assessment and Plan of Treatment: Your INR on admission was elevated for which coumadin was briefly held. Your levels are now stable, you can continue coumadin, digoxin and lopressor as prescribed. Continue care as per hospice team    Diagnosis: Combined systolic and diastolic heart failure, unspecified HF chronicity  Assessment and Plan of Treatment: Your echocardiogram on admission showed pumping action of your heart being 15-20%, with impaired relaxation as well, being grade 3. You were seen by our cardiologsit in the hospital and medications adjusted appropriately. Continue care as per hospice team.    Diagnosis: HTN (hypertension)  Assessment and Plan of Treatment: Your blood pressure was well maintained on medications as were adjusted for your heart failure and atrial fibrillation. Continue with care as per hospice team    Diagnosis: Chronic kidney disease (CKD)  Assessment and Plan of Treatment: Your kidney function was stable duting hospital stay.    Diagnosis: Debility  Assessment and Plan of Treatment: continue with physical therapy at [facility].    Diagnosis: Protein-calorie malnutrition  Assessment and Plan of Treatment: continue diet as tolerated.    Diagnosis: Parkinsons  Assessment and Plan of Treatment: Continue with sinement at home dose

## 2019-05-08 NOTE — PROGRESS NOTE ADULT - ASSESSMENT
-HCAP-left   -elevated troponin-2/2 demand ischemia  - resolved supertx INR  -low ef-ef 15-20%,grade 3 diastolic dysfct.  -HX; CAD,AF,HTN,CHF,HLD,PVD CVA,DEMENTIA, PD, CKD,BPH    PLAN; ZOSYN           F/U INR-COUMADIN           CARDIO F/U -ACEI/LASIX/B BLOCKER/DIG           DYSPHAGIA DIET           PT           CONSIDER DISCHARGE BACK TO [facility] UNDER DR. CAMERON TO COMPLETE 10 D ANTIBX

## 2019-05-09 ENCOUNTER — TRANSCRIPTION ENCOUNTER (OUTPATIENT)
Age: 84
End: 2019-05-09

## 2019-05-09 VITALS — HEART RATE: 93 BPM | DIASTOLIC BLOOD PRESSURE: 60 MMHG | SYSTOLIC BLOOD PRESSURE: 155 MMHG

## 2019-05-09 DIAGNOSIS — Z51.5 ENCOUNTER FOR PALLIATIVE CARE: ICD-10-CM

## 2019-05-09 DIAGNOSIS — E43 UNSPECIFIED SEVERE PROTEIN-CALORIE MALNUTRITION: ICD-10-CM

## 2019-05-09 DIAGNOSIS — R53.81 OTHER MALAISE: ICD-10-CM

## 2019-05-09 LAB
ANION GAP SERPL CALC-SCNC: 6 MMOL/L — SIGNIFICANT CHANGE UP (ref 5–17)
APTT BLD: 41.1 SEC — HIGH (ref 27.5–36.3)
BUN SERPL-MCNC: 30 MG/DL — HIGH (ref 7–18)
CALCIUM SERPL-MCNC: 8.4 MG/DL — SIGNIFICANT CHANGE UP (ref 8.4–10.5)
CHLORIDE SERPL-SCNC: 109 MMOL/L — HIGH (ref 96–108)
CO2 SERPL-SCNC: 32 MMOL/L — HIGH (ref 22–31)
CREAT SERPL-MCNC: 1.23 MG/DL — SIGNIFICANT CHANGE UP (ref 0.5–1.3)
GLUCOSE SERPL-MCNC: 92 MG/DL — SIGNIFICANT CHANGE UP (ref 70–99)
HCT VFR BLD CALC: 35.2 % — LOW (ref 39–50)
HGB BLD-MCNC: 11.4 G/DL — LOW (ref 13–17)
INR BLD: 2.22 RATIO — HIGH (ref 0.88–1.16)
MCHC RBC-ENTMCNC: 28.3 PG — SIGNIFICANT CHANGE UP (ref 27–34)
MCHC RBC-ENTMCNC: 32.4 GM/DL — SIGNIFICANT CHANGE UP (ref 32–36)
MCV RBC AUTO: 87.3 FL — SIGNIFICANT CHANGE UP (ref 80–100)
NRBC # BLD: 0 /100 WBCS — SIGNIFICANT CHANGE UP (ref 0–0)
PLATELET # BLD AUTO: 151 K/UL — SIGNIFICANT CHANGE UP (ref 150–400)
POTASSIUM SERPL-MCNC: 3.7 MMOL/L — SIGNIFICANT CHANGE UP (ref 3.5–5.3)
POTASSIUM SERPL-SCNC: 3.7 MMOL/L — SIGNIFICANT CHANGE UP (ref 3.5–5.3)
PROTHROM AB SERPL-ACNC: 25.3 SEC — HIGH (ref 10–12.9)
RBC # BLD: 4.03 M/UL — LOW (ref 4.2–5.8)
RBC # FLD: 19.1 % — HIGH (ref 10.3–14.5)
SODIUM SERPL-SCNC: 147 MMOL/L — HIGH (ref 135–145)
WBC # BLD: 7.51 K/UL — SIGNIFICANT CHANGE UP (ref 3.8–10.5)
WBC # FLD AUTO: 7.51 K/UL — SIGNIFICANT CHANGE UP (ref 3.8–10.5)

## 2019-05-09 PROCEDURE — 96375 TX/PRO/DX INJ NEW DRUG ADDON: CPT | Mod: 76

## 2019-05-09 PROCEDURE — 82803 BLOOD GASES ANY COMBINATION: CPT

## 2019-05-09 PROCEDURE — 81001 URINALYSIS AUTO W/SCOPE: CPT

## 2019-05-09 PROCEDURE — 84443 ASSAY THYROID STIM HORMONE: CPT

## 2019-05-09 PROCEDURE — 82962 GLUCOSE BLOOD TEST: CPT

## 2019-05-09 PROCEDURE — 85610 PROTHROMBIN TIME: CPT

## 2019-05-09 PROCEDURE — 94660 CPAP INITIATION&MGMT: CPT

## 2019-05-09 PROCEDURE — 92610 EVALUATE SWALLOWING FUNCTION: CPT

## 2019-05-09 PROCEDURE — 82550 ASSAY OF CK (CPK): CPT

## 2019-05-09 PROCEDURE — 80053 COMPREHEN METABOLIC PANEL: CPT

## 2019-05-09 PROCEDURE — 70450 CT HEAD/BRAIN W/O DYE: CPT

## 2019-05-09 PROCEDURE — 96374 THER/PROPH/DIAG INJ IV PUSH: CPT

## 2019-05-09 PROCEDURE — 93005 ELECTROCARDIOGRAM TRACING: CPT

## 2019-05-09 PROCEDURE — 87086 URINE CULTURE/COLONY COUNT: CPT

## 2019-05-09 PROCEDURE — 83880 ASSAY OF NATRIURETIC PEPTIDE: CPT

## 2019-05-09 PROCEDURE — 83605 ASSAY OF LACTIC ACID: CPT

## 2019-05-09 PROCEDURE — 85730 THROMBOPLASTIN TIME PARTIAL: CPT

## 2019-05-09 PROCEDURE — 94640 AIRWAY INHALATION TREATMENT: CPT

## 2019-05-09 PROCEDURE — 85027 COMPLETE CBC AUTOMATED: CPT

## 2019-05-09 PROCEDURE — 84100 ASSAY OF PHOSPHORUS: CPT

## 2019-05-09 PROCEDURE — 83036 HEMOGLOBIN GLYCOSYLATED A1C: CPT

## 2019-05-09 PROCEDURE — 80048 BASIC METABOLIC PNL TOTAL CA: CPT

## 2019-05-09 PROCEDURE — 97163 PT EVAL HIGH COMPLEX 45 MIN: CPT

## 2019-05-09 PROCEDURE — 87631 RESP VIRUS 3-5 TARGETS: CPT

## 2019-05-09 PROCEDURE — 82607 VITAMIN B-12: CPT

## 2019-05-09 PROCEDURE — 93306 TTE W/DOPPLER COMPLETE: CPT

## 2019-05-09 PROCEDURE — 82306 VITAMIN D 25 HYDROXY: CPT

## 2019-05-09 PROCEDURE — 84484 ASSAY OF TROPONIN QUANT: CPT

## 2019-05-09 PROCEDURE — 71045 X-RAY EXAM CHEST 1 VIEW: CPT

## 2019-05-09 PROCEDURE — 83735 ASSAY OF MAGNESIUM: CPT

## 2019-05-09 PROCEDURE — 82553 CREATINE MB FRACTION: CPT

## 2019-05-09 PROCEDURE — 71250 CT THORAX DX C-: CPT

## 2019-05-09 PROCEDURE — 83690 ASSAY OF LIPASE: CPT

## 2019-05-09 PROCEDURE — 87899 AGENT NOS ASSAY W/OPTIC: CPT

## 2019-05-09 PROCEDURE — 99223 1ST HOSP IP/OBS HIGH 75: CPT

## 2019-05-09 PROCEDURE — 92526 ORAL FUNCTION THERAPY: CPT

## 2019-05-09 PROCEDURE — 99285 EMERGENCY DEPT VISIT HI MDM: CPT | Mod: 25

## 2019-05-09 PROCEDURE — 36415 COLL VENOUS BLD VENIPUNCTURE: CPT

## 2019-05-09 PROCEDURE — 87040 BLOOD CULTURE FOR BACTERIA: CPT

## 2019-05-09 PROCEDURE — 80061 LIPID PANEL: CPT

## 2019-05-09 PROCEDURE — 94760 N-INVAS EAR/PLS OXIMETRY 1: CPT

## 2019-05-09 RX ORDER — WARFARIN SODIUM 2.5 MG/1
2 TABLET ORAL ONCE
Refills: 0 | Status: DISCONTINUED | OUTPATIENT
Start: 2019-05-09 | End: 2019-05-09

## 2019-05-09 RX ORDER — LOSARTAN POTASSIUM 100 MG/1
1 TABLET, FILM COATED ORAL
Qty: 0 | Refills: 0 | DISCHARGE

## 2019-05-09 RX ORDER — FUROSEMIDE 40 MG
20 TABLET ORAL DAILY
Refills: 0 | Status: DISCONTINUED | OUTPATIENT
Start: 2019-05-09 | End: 2019-05-09

## 2019-05-09 RX ORDER — LISINOPRIL 2.5 MG/1
1 TABLET ORAL
Qty: 0 | Refills: 0 | DISCHARGE
Start: 2019-05-09

## 2019-05-09 RX ORDER — LISINOPRIL 2.5 MG/1
5 TABLET ORAL
Refills: 0 | Status: DISCONTINUED | OUTPATIENT
Start: 2019-05-09 | End: 2019-05-09

## 2019-05-09 RX ORDER — HYDRALAZINE HCL 50 MG
1 TABLET ORAL
Qty: 0 | Refills: 0 | DISCHARGE

## 2019-05-09 RX ORDER — WARFARIN SODIUM 2.5 MG/1
1 TABLET ORAL
Qty: 0 | Refills: 0 | DISCHARGE
Start: 2019-05-09

## 2019-05-09 RX ORDER — CEFUROXIME AXETIL 250 MG
1 TABLET ORAL
Qty: 4 | Refills: 0
Start: 2019-05-09 | End: 2019-05-10

## 2019-05-09 RX ORDER — WARFARIN SODIUM 2.5 MG/1
1 TABLET ORAL
Qty: 0 | Refills: 0 | DISCHARGE

## 2019-05-09 RX ORDER — ASPIRIN/CALCIUM CARB/MAGNESIUM 324 MG
1 TABLET ORAL
Qty: 0 | Refills: 0 | DISCHARGE
Start: 2019-05-09

## 2019-05-09 RX ORDER — DIGOXIN 250 MCG
1 TABLET ORAL
Qty: 0 | Refills: 0 | DISCHARGE
Start: 2019-05-09

## 2019-05-09 RX ORDER — SPIRONOLACTONE 25 MG/1
1 TABLET, FILM COATED ORAL
Qty: 0 | Refills: 0 | DISCHARGE
Start: 2019-05-09

## 2019-05-09 RX ADMIN — LISINOPRIL 2.5 MILLIGRAM(S): 2.5 TABLET ORAL at 05:35

## 2019-05-09 RX ADMIN — Medication 3 MILLILITER(S): at 15:08

## 2019-05-09 RX ADMIN — Medication 3 MILLILITER(S): at 09:20

## 2019-05-09 RX ADMIN — Medication 50 MILLIGRAM(S): at 05:35

## 2019-05-09 RX ADMIN — Medication 400 UNIT(S): at 12:32

## 2019-05-09 RX ADMIN — Medication 50 MILLIGRAM(S): at 12:32

## 2019-05-09 RX ADMIN — FINASTERIDE 5 MILLIGRAM(S): 5 TABLET, FILM COATED ORAL at 12:32

## 2019-05-09 RX ADMIN — Medication 20 MILLIGRAM(S): at 05:35

## 2019-05-09 RX ADMIN — Medication 81 MILLIGRAM(S): at 12:32

## 2019-05-09 RX ADMIN — PIPERACILLIN AND TAZOBACTAM 25 GRAM(S): 4; .5 INJECTION, POWDER, LYOPHILIZED, FOR SOLUTION INTRAVENOUS at 14:35

## 2019-05-09 RX ADMIN — LISINOPRIL 5 MILLIGRAM(S): 2.5 TABLET ORAL at 18:00

## 2019-05-09 RX ADMIN — Medication 50 MILLIGRAM(S): at 17:59

## 2019-05-09 RX ADMIN — CARBIDOPA AND LEVODOPA 1 TABLET(S): 25; 100 TABLET ORAL at 12:32

## 2019-05-09 RX ADMIN — Medication 3 MILLILITER(S): at 02:52

## 2019-05-09 RX ADMIN — SPIRONOLACTONE 25 MILLIGRAM(S): 25 TABLET, FILM COATED ORAL at 05:35

## 2019-05-09 RX ADMIN — PIPERACILLIN AND TAZOBACTAM 25 GRAM(S): 4; .5 INJECTION, POWDER, LYOPHILIZED, FOR SOLUTION INTRAVENOUS at 05:35

## 2019-05-09 NOTE — PROGRESS NOTE ADULT - SUBJECTIVE AND OBJECTIVE BOX
Patient is a 84y old  Male who presents with a chief complaint of shortness of breath (08 May 2019 15:08)      INTERVAL HPI/OVERNIGHT EVENTS:  feeling well, no sob-RA    VITAL SIGNS:  T(F): 97.1 (05-09-19 @ 05:10)  HR: 96 (05-09-19 @ 05:10)  BP: 162/84 (05-09-19 @ 05:10)  RR: 18 (05-09-19 @ 05:10)  SpO2: 97% (05-09-19 @ 05:10)  Wt(kg): --  I&O's Detail    08 May 2019 07:01  -  09 May 2019 07:00  --------------------------------------------------------  IN:  Total IN: 0 mL    OUT:    Indwelling Catheter - Urethral: 3000 mL  Total OUT: 3000 mL    Total NET: -3000 mL              REVIEW OF SYSTEMS:    CONSTITUTIONAL:  No fevers, chills, sweats    HEENT:  Eyes:  No diplopia or blurred vision. ENT:  No earache, sore throat or runny nose.    CARDIOVASCULAR:  No pressure, squeezing, tightness, or heaviness about the chest; no palpitations.    RESPIRATORY:  Per HPI    GASTROINTESTINAL:  No abdominal pain, nausea, vomiting or diarrhea.    GENITOURINARY:  No dysuria, frequency or urgency.    NEUROLOGIC:  No paresthesias, fasciculations, seizures or weakness.    PSYCHIATRIC:  No disorder of thought or mood.      PHYSICAL EXAM:    Constitutional:no complaints  ENMT:atnc  Neck:supple  Respiratory:clear  Cardiovascular:IRREG  Gastrointestinal:soft  Extremities:no edema  Vascular:intact  Neurological:non focal  Musculoskeletal:no edema, normal rom    MEDICATIONS  (STANDING):  ALBUTerol/ipratropium for Nebulization 3 milliLiter(s) Nebulizer every 6 hours  aspirin  chewable 81 milliGRAM(s) Oral daily  atorvastatin 40 milliGRAM(s) Oral at bedtime  carbidopa/levodopa  25/100 1 Tablet(s) Oral daily  cholecalciferol 400 Unit(s) Oral daily  digoxin     Tablet 0.125 milliGRAM(s) Oral every other day  finasteride 5 milliGRAM(s) Oral daily  furosemide   Injectable 20 milliGRAM(s) IV Push daily  lisinopril 2.5 milliGRAM(s) Oral two times a day  metoprolol tartrate 50 milliGRAM(s) Oral two times a day  piperacillin/tazobactam IVPB. 3.375 Gram(s) IV Intermittent every 8 hours  pyridoxine 50 milliGRAM(s) Oral daily  spironolactone 25 milliGRAM(s) Oral daily  tamsulosin 0.4 milliGRAM(s) Oral at bedtime  warfarin 2 milliGRAM(s) Oral once    MEDICATIONS  (PRN):      Allergies    No Known Allergies            LABS:                        11.4   7.51  )-----------( 151      ( 09 May 2019 07:08 )             35.2     05-09    147<H>  |  109<H>  |  30<H>  ----------------------------<  92  3.7   |  32<H>  |  1.23    Ca    8.4      09 May 2019 07:08      PT/INR - ( 09 May 2019 07:08 )   PT: 25.3 sec;   INR: 2.22 ratio         PTT - ( 09 May 2019 07:08 )  PTT:41.1 sec    ABG - ( 08 May 2019 11:10 )  pH, Arterial: 7.50  pH, Blood: x     /  pCO2: 39    /  pO2: 79    / HCO3: 30    / Base Excess: 6.7   /  SaO2: 96                          POCT Blood Glucose.: 122 mg/dL (08 May 2019 10:48)        RADIOLOGY & ADDITIONAL TESTS:    Ct scan;  EXAM:  CT BRAIN                            PROCEDURE DATE:  05/08/2019          INTERPRETATION:  HISTORY: Lethargy    There is moderate motion induced artifact    Evaluation demonstrates no evidence of mass-effect or midline shift. No   intraparenchymal mass lesions or hemorrhage is identified. There is   generalized age typical  atrophy and chronic white matter degeneration.   There is a small chronic lacunar infarct in the right basal ganglia.   There is no evidence of hydrocephalus. No extra-axial collections are   noted.    The bone windows demonstrate no gross osseous abnormalities.    Impression:  1. no acute findings.          ekg;    echo:

## 2019-05-09 NOTE — CONSULT NOTE ADULT - PROBLEM SELECTOR PROBLEM 3
Severe protein-calorie malnutrition Combined systolic and diastolic heart failure, unspecified HF chronicity

## 2019-05-09 NOTE — PROGRESS NOTE ADULT - ASSESSMENT
-HCAP-left   -elevated troponin-2/2 demand ischemia  - resolved supertx INR  -low ef-ef 15-20%,grade 3 diastolic dysfct.  - s/p episode of lethargy-neg ct head and no hypercarbia-now resolved  -HX; CAD,AF,HTN,CHF,HLD,PVD CVA,DEMENTIA, PD, CKD,BPH    PLAN; ZOSYN           F/U INR-COUMADIN           CARDIO F/U -ACEI/LASIX/B BLOCKER/DIG           DYSPHAGIA DIET           PT           CONSIDER DISCHARGE BACK TO [facility] UNDER DR. CAMERON TO COMPLETE 10 D ANTIBX-NOW ON DAY 8-CAN SEND HOME ON CEFTIN TO COMPLETE COURSE

## 2019-05-09 NOTE — PROGRESS NOTE ADULT - PROBLEM SELECTOR PROBLEM 9
Chronic kidney disease (CKD)

## 2019-05-09 NOTE — PROGRESS NOTE ADULT - PROBLEM SELECTOR PROBLEM 5
Cerebrovascular accident (CVA)

## 2019-05-09 NOTE — CONSULT NOTE ADULT - PROBLEM SELECTOR RECOMMENDATION 4
Likely due to progressive CHF, fatigue, acute illness, infection.  Albumin 1.9. With skin failure.  Diet as tolerated.  SLP ty noted.  Family does not want PEG.

## 2019-05-09 NOTE — PROGRESS NOTE ADULT - PROVIDER SPECIALTY LIST ADULT
Cardiology
Infectious Disease
Internal Medicine

## 2019-05-09 NOTE — DISCHARGE NOTE NURSING/CASE MANAGEMENT/SOCIAL WORK - NSDCPEPTSTRK_GEN_ALL_CORE
Stroke warning signs and symptoms/Signs and symptoms of stroke/Need for follow up after discharge/Stroke support groups for patients, families, and friends/Prescribed medications/Risk factors for stroke/Stroke education booklet/Call 911 for stroke

## 2019-05-09 NOTE — CONSULT NOTE ADULT - PROBLEM SELECTOR RECOMMENDATION 3
Pt reports being SOB, on NC.  Diurese as tolerated.    Echo noted with EF 15-20%, gr3 DD, moderate conc LVH, severely dilated LA, increased RV systolic pressure.     Discussed disease trajectory with son, risk for re-hospitalizations and declining functional status.  Patient is hospice eligible due to frequent hospitalizations, shortness of breath at rest, albumin <1.9,  and decreased appetite  Son is agreeable for LTC with hospice.

## 2019-05-09 NOTE — CONSULT NOTE ADULT - PROBLEM SELECTOR RECOMMENDATION 9
Required Bipap, now on NC.  Receiving antibiotics.       CXR showing opacity of left upper lobe  Blood cultures NGTD

## 2019-05-09 NOTE — PROGRESS NOTE ADULT - SUBJECTIVE AND OBJECTIVE BOX
84y Male who is looking better and is barely coughing, he has no sob or chest pain, no fevers or diarrhea. He will be going to rehab today.    Meds:  piperacillin/tazobactam IVPB. 3.375 Gram(s) IV Intermittent every 8 hours    Allergies    No Known Allergies    Intolerances        VITALS:  Vital Signs Last 24 Hrs  T(C): 36.4 (09 May 2019 14:21), Max: 37 (09 May 2019 01:21)  T(F): 97.6 (09 May 2019 14:21), Max: 98.6 (09 May 2019 01:21)  HR: 83 (09 May 2019 14:21) (71 - 96)  BP: 144/66 (09 May 2019 14:21) (129/72 - 162/84)  BP(mean): --  RR: 17 (09 May 2019 14:21) (17 - 18)  SpO2: 95% (09 May 2019 14:21) (94% - 97%)    LABS/DIAGNOSTIC TESTS:                          11.4   7.51  )-----------( 151      ( 09 May 2019 07:08 )             35.2         05-09    147<H>  |  109<H>  |  30<H>  ----------------------------<  92  3.7   |  32<H>  |  1.23    Ca    8.4      09 May 2019 07:08            CULTURES: .Blood  05-02 @ 06:12   No growth at 5 days.  --  --      .Urine  05-02 @ 06:09   <10,000 CFU/mL Normal Urogenital Seema  --  --            RADIOLOGY:      ROS:  [  ] UNABLE TO ELICIT

## 2019-05-09 NOTE — CONSULT NOTE ADULT - SUBJECTIVE AND OBJECTIVE BOX
HPI:  84 year old male PMH CAD, CVA, CKD, BPH, Parkinson's, dementia, Afib on coumadin, HTN, CHF, PVD, HLD was brought in by ambulance from [facility] for shortness of breath. . unable to obtain further history from pt 2/2 dementia. According to ED provider, son states that patient at baseline follows basic commands and is confused; FULL CODE. (02 May 2019 06:15)      PAST MEDICAL & SURGICAL HISTORY:  CAD (coronary artery disease): s/p cabg  Chronic kidney disease (CKD)  Cerebrovascular accident (CVA)  HLD (hyperlipidemia)  HTN (hypertension)  Chronic CHF  Dementia  Parkinsons  Afib: on coumadin  S/P CABG x 1      SOCIAL HISTORY:    Admitted from:  home assisted living [facility]   Substance abuse history:              Tobacco hx:                  Alcohol hx:              Home Opioid hx:  Congregational:                                    Preferred Language:    Surrogate/HCP/Guardian:            Phone#:    FAMILY HISTORY:  Family history unknown    Baseline ADLs (prior to admission):    Allergies    No Known Allergies    Intolerances      Present Symptoms:   Dyspnea:   Nausea/Vomiting:   Anxiety:  Depressed   Fatigue:  Loss of appetite:   Pain:                                location:          Review of Systems: [All others negative or Unable to obtain due to poor mentation]    MEDICATIONS  (STANDING):  ALBUTerol/ipratropium for Nebulization 3 milliLiter(s) Nebulizer every 6 hours  aspirin  chewable 81 milliGRAM(s) Oral daily  atorvastatin 40 milliGRAM(s) Oral at bedtime  carbidopa/levodopa  25/100 1 Tablet(s) Oral daily  cholecalciferol 400 Unit(s) Oral daily  digoxin     Tablet 0.125 milliGRAM(s) Oral every other day  finasteride 5 milliGRAM(s) Oral daily  furosemide    Tablet 20 milliGRAM(s) Oral daily  lisinopril 5 milliGRAM(s) Oral two times a day  metoprolol tartrate 50 milliGRAM(s) Oral two times a day  piperacillin/tazobactam IVPB. 3.375 Gram(s) IV Intermittent every 8 hours  pyridoxine 50 milliGRAM(s) Oral daily  spironolactone 25 milliGRAM(s) Oral daily  tamsulosin 0.4 milliGRAM(s) Oral at bedtime  warfarin 2 milliGRAM(s) Oral once    MEDICATIONS  (PRN):      PHYSICAL EXAM:    Vital Signs Last 24 Hrs  T(C): 36.3 (09 May 2019 10:49), Max: 37 (09 May 2019 01:21)  T(F): 97.4 (09 May 2019 10:49), Max: 98.6 (09 May 2019 01:21)  HR: 85 (09 May 2019 10:49) (71 - 96)  BP: 129/72 (09 May 2019 10:49) (129/72 - 162/84)  BP(mean): --  RR: 17 (09 May 2019 10:49) (17 - 18)  SpO2: 96% (09 May 2019 10:49) (94% - 97%)    General: alert  oriented x ____    [lethargic distressed cachexia  nonverbal  unarousable verbal]  Karnofsky Performance Score/Palliative Performance Status Version2:     %    HEENT: normal  dry mouth  ET tube/trach oral lesions:  Lungs: comfortable tachypnea/labored breathing  excessive secretions  CV: normal  tachycardia  GI: normal  distended  tender  incontinent               PEG/NG/OG tube  constipation  last BM:   : normal  incontinent  oliguria/anuria  an  Musculoskeletal: normal  weakness  edema             ambulatory  bedbound/wheelchair bound  Skin: normal  pressure ulcers: stage: edema: other:  Neuro: no deficits cognitive impairment dsyphagia/dysarthria paresis: other:  Oral intake ability: unable/only mouth care [minimal moderate full capability]  Diet: [NPO]    LABS:                        11.4   7.51  )-----------( 151      ( 09 May 2019 07:08 )             35.2     05-09    147<H>  |  109<H>  |  30<H>  ----------------------------<  92  3.7   |  32<H>  |  1.23    Ca    8.4      09 May 2019 07:08          RADIOLOGY & ADDITIONAL STUDIES:    ADVANCE DIRECTIVES: HPI:  84 year old male PMH CAD, CVA, CKD, BPH, Parkinson's, dementia, Afib on coumadin, HTN, CHF, PVD, HLD was brought in by ambulance from [facility] for shortness of breath. . unable to obtain further history from pt 2/2 dementia. According to ED provider, son states that patient at baseline follows basic commands and is confused; FULL CODE. (02 May 2019 06:15)      PAST MEDICAL & SURGICAL HISTORY:  CAD (coronary artery disease): s/p cabg  Chronic kidney disease (CKD)  Cerebrovascular accident (CVA)  HLD (hyperlipidemia)  HTN (hypertension)  Chronic CHF  Dementia  Parkinsons  Afib: on coumadin  S/P CABG x 1      SOCIAL HISTORY:    Admitted from: [facility]  Patient is , has 4 children.  Fei Dutta Jr is the assigned HCP.        HCP: Fei Nguyen Jr.          Phone#: [phone]        FAMILY HISTORY:  Family history unknown    Baseline ADLs (prior to admission):  Bedbound, periodic confusion    Allergies    No Known Allergies    Intolerances      Present Symptoms:   Dyspnea: yes  Nausea/Vomiting: denies  Anxiety: denies   Fatigue: denies  Loss of appetite: deneis  Pain:  denies    Review of Systems: [All others negative     MEDICATIONS  (STANDING):  ALBUTerol/ipratropium for Nebulization 3 milliLiter(s) Nebulizer every 6 hours  aspirin  chewable 81 milliGRAM(s) Oral daily  atorvastatin 40 milliGRAM(s) Oral at bedtime  carbidopa/levodopa  25/100 1 Tablet(s) Oral daily  cholecalciferol 400 Unit(s) Oral daily  digoxin     Tablet 0.125 milliGRAM(s) Oral every other day  finasteride 5 milliGRAM(s) Oral daily  furosemide    Tablet 20 milliGRAM(s) Oral daily  lisinopril 5 milliGRAM(s) Oral two times a day  metoprolol tartrate 50 milliGRAM(s) Oral two times a day  piperacillin/tazobactam IVPB. 3.375 Gram(s) IV Intermittent every 8 hours  pyridoxine 50 milliGRAM(s) Oral daily  spironolactone 25 milliGRAM(s) Oral daily  tamsulosin 0.4 milliGRAM(s) Oral at bedtime  warfarin 2 milliGRAM(s) Oral once    MEDICATIONS  (PRN):      PHYSICAL EXAM:    Vital Signs Last 24 Hrs  T(C): 36.3 (09 May 2019 10:49), Max: 37 (09 May 2019 01:21)  T(F): 97.4 (09 May 2019 10:49), Max: 98.6 (09 May 2019 01:21)  HR: 85 (09 May 2019 10:49) (71 - 96)  BP: 129/72 (09 May 2019 10:49) (129/72 - 162/84)  BP(mean): --  RR: 17 (09 May 2019 10:49) (17 - 18)  SpO2: 96% (09 May 2019 10:49) (94% - 97%)    General: alert  oriented x ____    [lethargic distressed cachexia  nonverbal  unarousable verbal]  Karnofsky Performance Score/Palliative Performance Status Version2:     %    HEENT: normal  dry mouth  ET tube/trach oral lesions:  Lungs: comfortable tachypnea/labored breathing  excessive secretions  CV: normal  tachycardia  GI: normal  distended  tender  incontinent               PEG/NG/OG tube  constipation  last BM:   : normal  incontinent  oliguria/anuria  an  Musculoskeletal: normal  weakness  edema             ambulatory  bedbound/wheelchair bound  Skin: normal  pressure ulcers: stage: edema: other:  Neuro: no deficits cognitive impairment dsyphagia/dysarthria paresis: other:  Oral intake ability: unable/only mouth care [minimal moderate full capability]  Diet: [NPO]    LABS:                        11.4   7.51  )-----------( 151      ( 09 May 2019 07:08 )             35.2     05-09    147<H>  |  109<H>  |  30<H>  ----------------------------<  92  3.7   |  32<H>  |  1.23    Ca    8.4      09 May 2019 07:08          RADIOLOGY & ADDITIONAL STUDIES:    ADVANCE DIRECTIVES: HPI:  84 year old male PMH CAD, CVA, CKD, BPH, Parkinson's, dementia, Afib on coumadin, HTN, CHF, PVD, HLD was brought in by ambulance from [facility] for shortness of breath. . unable to obtain further history from pt 2/2 dementia. According to ED provider, son states that patient at baseline follows basic commands and is confused; FULL CODE. (02 May 2019 06:15)      Interval history: On MADISON EF 15-20%.  DNR on file.  Palliative care consulted to evaluate for hospice eligibility.      PAST MEDICAL & SURGICAL HISTORY:  CAD (coronary artery disease): s/p cabg  Chronic kidney disease (CKD)  Cerebrovascular accident (CVA)  HLD (hyperlipidemia)  HTN (hypertension)  Chronic CHF  Dementia  Parkinsons  Afib: on coumadin  S/P CABG x 1      SOCIAL HISTORY:    Admitted from: [facility]  Patient is , has 4 children.  Fei Dutta Jr is the assigned HCP.        HCP: Fei Nguyen Jr.          Phone#: [phone]        FAMILY HISTORY:  Family history unknown    Baseline ADLs (prior to admission):  Bedbound, periodic confusion    Allergies    No Known Allergies    Intolerances      Present Symptoms:   Dyspnea: yes  Nausea/Vomiting: denies  Anxiety: denies   Fatigue: denies  Loss of appetite: deneis  Pain:  denies    Review of Systems: [All others negative     MEDICATIONS  (STANDING):  ALBUTerol/ipratropium for Nebulization 3 milliLiter(s) Nebulizer every 6 hours  aspirin  chewable 81 milliGRAM(s) Oral daily  atorvastatin 40 milliGRAM(s) Oral at bedtime  carbidopa/levodopa  25/100 1 Tablet(s) Oral daily  cholecalciferol 400 Unit(s) Oral daily  digoxin     Tablet 0.125 milliGRAM(s) Oral every other day  finasteride 5 milliGRAM(s) Oral daily  furosemide    Tablet 20 milliGRAM(s) Oral daily  lisinopril 5 milliGRAM(s) Oral two times a day  metoprolol tartrate 50 milliGRAM(s) Oral two times a day  piperacillin/tazobactam IVPB. 3.375 Gram(s) IV Intermittent every 8 hours  pyridoxine 50 milliGRAM(s) Oral daily  spironolactone 25 milliGRAM(s) Oral daily  tamsulosin 0.4 milliGRAM(s) Oral at bedtime  warfarin 2 milliGRAM(s) Oral once    MEDICATIONS  (PRN):      PHYSICAL EXAM:    Vital Signs Last 24 Hrs  T(C): 36.3 (09 May 2019 10:49), Max: 37 (09 May 2019 01:21)  T(F): 97.4 (09 May 2019 10:49), Max: 98.6 (09 May 2019 01:21)  HR: 85 (09 May 2019 10:49) (71 - 96)  BP: 129/72 (09 May 2019 10:49) (129/72 - 162/84)  BP(mean): --  RR: 17 (09 May 2019 10:49) (17 - 18)  SpO2: 96% (09 May 2019 10:49) (94% - 97%)    General: +Bi temporal wasting,  Awake, Alert, pleasantly confused.     Karnofsky Performance Score/Palliative Performance Status Version2:  30 %    HEENT: oropharynx clear  Lungs: tachypnea, on NC  CV: S1S2, RRR  GI: soft, nontender   :   an  Musculoskeletal: no edema  Skin: fragile, Stage 2 Pressure Injury to the R. Gluteus and L. Ischial areas; Stage 1 Pressure Injury to the Bilateral Lat Foot areas  Neuro: able to follow simple commands  Oral intake ability: poor po intake  Diet: Nectar thick    LABS:                        11.4   7.51  )-----------( 151      ( 09 May 2019 07:08 )             35.2     05-09    147<H>  |  109<H>  |  30<H>  ----------------------------<  92  3.7   |  32<H>  |  1.23    Ca    8.4      09 May 2019 07:08          RADIOLOGY & ADDITIONAL STUDIES:  Reviewed    ADVANCE DIRECTIVES: MOLST; DNR/DNI/DNH HPI:  84 year old male PMH CAD, CVA, CKD, BPH, Parkinson's, dementia, Afib on coumadin, HTN, CHF, PVD, HLD was brought in by ambulance from [facility] for shortness of breath. . unable to obtain further history from pt 2/2 dementia. According to ED provider, son states that patient at baseline follows basic commands and is confused; FULL CODE. (02 May 2019 06:15)      Interval history: On MADISON EF 15-20%.  DNR on file.  Palliative care consulted to address further goals of care.       PAST MEDICAL & SURGICAL HISTORY:  CAD (coronary artery disease): s/p cabg  Chronic kidney disease (CKD)  Cerebrovascular accident (CVA)  HLD (hyperlipidemia)  HTN (hypertension)  Chronic CHF  Dementia  Parkinsons  Afib: on coumadin  S/P CABG x 1      SOCIAL HISTORY:    Admitted from: [facility]  Patient is , has 4 children.  Fei Dutta Jr is the assigned HCP.        HCP: Fei Nguyen Jr.          Phone#: [phone]        FAMILY HISTORY:  Family history unknown    Baseline ADLs (prior to admission):  Bedbound, periodic confusion    Allergies    No Known Allergies    Intolerances      Present Symptoms:   Dyspnea: yes  Nausea/Vomiting: denies  Anxiety: denies   Fatigue: denies  Loss of appetite: deneis  Pain:  denies    Review of Systems: [All others negative     MEDICATIONS  (STANDING):  ALBUTerol/ipratropium for Nebulization 3 milliLiter(s) Nebulizer every 6 hours  aspirin  chewable 81 milliGRAM(s) Oral daily  atorvastatin 40 milliGRAM(s) Oral at bedtime  carbidopa/levodopa  25/100 1 Tablet(s) Oral daily  cholecalciferol 400 Unit(s) Oral daily  digoxin     Tablet 0.125 milliGRAM(s) Oral every other day  finasteride 5 milliGRAM(s) Oral daily  furosemide    Tablet 20 milliGRAM(s) Oral daily  lisinopril 5 milliGRAM(s) Oral two times a day  metoprolol tartrate 50 milliGRAM(s) Oral two times a day  piperacillin/tazobactam IVPB. 3.375 Gram(s) IV Intermittent every 8 hours  pyridoxine 50 milliGRAM(s) Oral daily  spironolactone 25 milliGRAM(s) Oral daily  tamsulosin 0.4 milliGRAM(s) Oral at bedtime  warfarin 2 milliGRAM(s) Oral once    MEDICATIONS  (PRN):      PHYSICAL EXAM:    Vital Signs Last 24 Hrs  T(C): 36.3 (09 May 2019 10:49), Max: 37 (09 May 2019 01:21)  T(F): 97.4 (09 May 2019 10:49), Max: 98.6 (09 May 2019 01:21)  HR: 85 (09 May 2019 10:49) (71 - 96)  BP: 129/72 (09 May 2019 10:49) (129/72 - 162/84)  BP(mean): --  RR: 17 (09 May 2019 10:49) (17 - 18)  SpO2: 96% (09 May 2019 10:49) (94% - 97%)    General: +Bi temporal wasting,  Awake, Alert, pleasantly confused.     Karnofsky Performance Score/Palliative Performance Status Version2:  30 %    HEENT: oropharynx clear  Lungs: tachypnea, on NC  CV: S1S2, RRR  GI: soft, nontender   :   an  Musculoskeletal: no edema  Skin: fragile, Stage 2 Pressure Injury to the R. Gluteus and L. Ischial areas; Stage 1 Pressure Injury to the Bilateral Lat Foot areas  Neuro: able to follow simple commands  Oral intake ability: poor po intake  Diet: Nectar thick    LABS:                        11.4   7.51  )-----------( 151      ( 09 May 2019 07:08 )             35.2     05-09    147<H>  |  109<H>  |  30<H>  ----------------------------<  92  3.7   |  32<H>  |  1.23    Ca    8.4      09 May 2019 07:08          RADIOLOGY & ADDITIONAL STUDIES:  Reviewed    ADVANCE DIRECTIVES: MOLST; DNR/DNI/DNH

## 2019-05-09 NOTE — PROGRESS NOTE ADULT - PROBLEM SELECTOR PLAN 3
On coumadin 3 at home  - P/w supratherapeutic INR on admission to 8.3, no evidence of bleeding, may be ?2/2 recent abx  - INR stable on 2mg coumadin,   - F.u INR   - Cardio Dr Henley

## 2019-05-09 NOTE — DISCHARGE NOTE NURSING/CASE MANAGEMENT/SOCIAL WORK - NSDCPEPT PROEDHF_GEN_ALL_CORE
Monitor weight daily/Call primary care provider for follow up after discharge/Activities as tolerated/Report signs and symptoms to primary care provider/Low salt diet

## 2019-05-09 NOTE — CONSULT NOTE ADULT - PROBLEM SELECTOR RECOMMENDATION 2
Parkinson's dementia. Primarily bedbound.  FAST 7c.  No behavior issues currently.    Educated patient's son on clinical course of dementia.  Hospice eligible

## 2019-05-09 NOTE — PROGRESS NOTE ADULT - SUBJECTIVE AND OBJECTIVE BOX
PGY1 Note discussed with supervising resident and primary attending.    Patient is a 84y old  Male who presents with a chief complaint of shortness of breath (09 May 2019 08:15)      INTERVAL HPI/OVERNIGHT EVENTS: patient assessed bedside, offers no complaints. Comfortable on NC.     MEDICATIONS  (STANDING):  ALBUTerol/ipratropium for Nebulization 3 milliLiter(s) Nebulizer every 6 hours  aspirin  chewable 81 milliGRAM(s) Oral daily  atorvastatin 40 milliGRAM(s) Oral at bedtime  carbidopa/levodopa  25/100 1 Tablet(s) Oral daily  cholecalciferol 400 Unit(s) Oral daily  digoxin     Tablet 0.125 milliGRAM(s) Oral every other day  finasteride 5 milliGRAM(s) Oral daily  furosemide   Injectable 20 milliGRAM(s) IV Push daily  lisinopril 2.5 milliGRAM(s) Oral two times a day  metoprolol tartrate 50 milliGRAM(s) Oral two times a day  piperacillin/tazobactam IVPB. 3.375 Gram(s) IV Intermittent every 8 hours  pyridoxine 50 milliGRAM(s) Oral daily  spironolactone 25 milliGRAM(s) Oral daily  tamsulosin 0.4 milliGRAM(s) Oral at bedtime  warfarin 2 milliGRAM(s) Oral once    MEDICATIONS  (PRN):      Allergies    No Known Allergies    Intolerances      REVIEW OF SYSTEMS:  CONSTITUTIONAL: No fever, or fatigue  RESPIRATORY: No cough, wheezing or shortness of breath  CARDIOVASCULAR: No chest pain, palpitations, leg swelling  GASTROINTESTINAL: No abdominal pain. No nausea, vomiting, diarrhea or constipation.  NEUROLOGICAL: No headaches, tremors  SKIN: No itching, burning, rashes, or lesions     Vital Signs Last 24 Hrs  T(C): 36.2 (09 May 2019 05:10), Max: 37 (09 May 2019 01:21)  T(F): 97.1 (09 May 2019 05:10), Max: 98.6 (09 May 2019 01:21)  HR: 96 (09 May 2019 05:10) (71 - 96)  BP: 162/84 (09 May 2019 05:10) (148/99 - 162/84)  BP(mean): --  RR: 18 (09 May 2019 05:10) (18 - 18)  SpO2: 97% (09 May 2019 05:10) (93% - 97%)    PHYSICAL EXAM:  GENERAL: NAD, well-groomed, well-developed, on NC  CHEST/LUNG: Clear to auscultation  HEART: Regular rate and rhythm; No murmurs, rubs, or gallops  ABDOMEN: Soft, Nontender, Nondistended; Bowel sounds present  NERVOUS SYSTEM:  Awake & alert, no focal deficits  EXTREMITIES:  2+ Peripheral Pulses, No clubbing, cyanosis, or edema    LABS:                        11.4   7.51  )-----------( 151      ( 09 May 2019 07:08 )             35.2     05-09    147<H>  |  109<H>  |  30<H>  ----------------------------<  92  3.7   |  32<H>  |  1.23    Ca    8.4      09 May 2019 07:08      PT/INR - ( 09 May 2019 07:08 )   PT: 25.3 sec;   INR: 2.22 ratio         PTT - ( 09 May 2019 07:08 )  PTT:41.1 sec    CAPILLARY BLOOD GLUCOSE      POCT Blood Glucose.: 122 mg/dL (08 May 2019 10:48)      Consultant(s) Notes Reviewed:  [ x ] YES  [ ] NO

## 2019-05-09 NOTE — PROGRESS NOTE ADULT - PROBLEM SELECTOR PLAN 7
-On lopressor for tachy + afib  -lopressor dose changed to 100mg bid  -continue low dose acei  -monitor BP

## 2019-05-09 NOTE — CONSULT NOTE ADULT - PROBLEM SELECTOR RECOMMENDATION 6
Spoke to the patient's son Fei Dutta Jr. ([phone]) via phone, discussed clinical status and poor prognosis.  Son acknowledges understanding of his father's declining health.    Regarding code status, explained that CPR and/or intubation in his debilitated state with advancing dementia would be unlikely to benefit him or improve his quality of life. Family are in agreement and would prefer to allow for natural death if his heart stops or he stops breathing. No PEG as well.  MOLST drafted and placed in chart.  Family is requesting LTC with hospice. Would like to continue medical management until cleared for discharge.   All questions answered.  Support provided.

## 2019-05-09 NOTE — PROGRESS NOTE ADULT - SUBJECTIVE AND OBJECTIVE BOX
CHIEF COMPLAINT:Patient is a 84y old  Male who presents with a chief complaint of shortness of breath.Pt appears comfortable.    	  REVIEW OF SYSTEMS:  CONSTITUTIONAL: No fever, weight loss, or fatigue  EYES: No eye pain, visual disturbances, or discharge  ENT:  No difficulty hearing, tinnitus, vertigo; No sinus or throat pain  NECK: No pain or stiffness  RESPIRATORY: No cough, wheezing, chills or hemoptysis; No Shortness of Breath  CARDIOVASCULAR: No chest pain, palpitations, passing out, dizziness, or leg swelling  GASTROINTESTINAL: No abdominal or epigastric pain. No nausea, vomiting, or hematemesis; No diarrhea or constipation. No melena or hematochezia.  GENITOURINARY: No dysuria, frequency, hematuria, or incontinence  NEUROLOGICAL: No headaches, memory loss, loss of strength, numbness, or tremors  SKIN: No itching, burning, rashes, or lesions   LYMPH Nodes: No enlarged glands  ENDOCRINE: No heat or cold intolerance; No hair loss  MUSCULOSKELETAL: No joint pain or swelling; No muscle, back, or extremity pain  PSYCHIATRIC: No depression, anxiety, mood swings, or difficulty sleeping  HEME/LYMPH: No easy bruising, or bleeding gums  ALLERGY AND IMMUNOLOGIC: No hives or eczema	      PHYSICAL EXAM:  T(C): 36.2 (05-09-19 @ 05:10), Max: 37 (05-09-19 @ 01:21)  HR: 96 (05-09-19 @ 05:10) (71 - 96)  BP: 162/84 (05-09-19 @ 05:10) (148/99 - 162/84)  RR: 18 (05-09-19 @ 05:10) (18 - 18)  SpO2: 97% (05-09-19 @ 05:10) (93% - 97%)  Wt(kg): --  I&O's Summary    08 May 2019 07:01  -  09 May 2019 07:00  --------------------------------------------------------  IN: 0 mL / OUT: 3000 mL / NET: -3000 mL    09 May 2019 07:01  -  09 May 2019 10:45  --------------------------------------------------------  IN: 0 mL / OUT: 900 mL / NET: -900 mL        Appearance: Normal	  HEENT:   Normal oral mucosa, PERRL, EOMI	  Lymphatic: No lymphadenopathy  Cardiovascular: Normal S1 S2, No JVD, No murmurs, No edema  Respiratory: Lungs clear to auscultation	  Psychiatry: A & O x 3, Mood & affect appropriate  Gastrointestinal:  Soft, Non-tender, + BS	  Skin: No rashes, No ecchymoses, No cyanosis	  Neurologic: Non-focal  Extremities: Normal range of motion, No clubbing, cyanosis or edema  Vascular: Peripheral pulses palpable 2+ bilaterally    MEDICATIONS  (STANDING):  ALBUTerol/ipratropium for Nebulization 3 milliLiter(s) Nebulizer every 6 hours  aspirin  chewable 81 milliGRAM(s) Oral daily  atorvastatin 40 milliGRAM(s) Oral at bedtime  carbidopa/levodopa  25/100 1 Tablet(s) Oral daily  cholecalciferol 400 Unit(s) Oral daily  digoxin     Tablet 0.125 milliGRAM(s) Oral every other day  finasteride 5 milliGRAM(s) Oral daily  furosemide    Tablet 20 milliGRAM(s) Oral daily  lisinopril 2.5 milliGRAM(s) Oral two times a day  metoprolol tartrate 50 milliGRAM(s) Oral two times a day  piperacillin/tazobactam IVPB. 3.375 Gram(s) IV Intermittent every 8 hours  pyridoxine 50 milliGRAM(s) Oral daily  spironolactone 25 milliGRAM(s) Oral daily  tamsulosin 0.4 milliGRAM(s) Oral at bedtime  warfarin 2 milliGRAM(s) Oral once      	  LABS:	 	                        11.4   7.51  )-----------( 151      ( 09 May 2019 07:08 )             35.2     05-09    147<H>  |  109<H>  |  30<H>  ----------------------------<  92  3.7   |  32<H>  |  1.23    Ca    8.4      09 May 2019 07:08      proBNP: Serum Pro-Brain Natriuretic Peptide: 5858 pg/mL (05-02 @ 01:39)    Lipid Profile: Cholesterol <50  LDL <17  HDL 27  TG 30    HgA1c: Hemoglobin A1C, Whole Blood: 6.1 % (05-02 @ 14:38)    TSH: Thyroid Stimulating Hormone, Serum: 0.21 uU/mL (05-02 @ 09:55)      	  PT/INR - ( 09 May 2019 07:08 )   PT: 25.3 sec;   INR: 2.22 ratio         PTT - ( 09 May 2019 07:08 )  PTT:41.1 sec      EXAM:  CT BRAIN                            PROCEDURE DATE:  05/08/2019          INTERPRETATION:  HISTORY: Lethargy    There is moderate motion induced artifact    Evaluation demonstrates no evidence of mass-effect or midline shift. No   intraparenchymal mass lesions or hemorrhage is identified. There is   generalized age typical  atrophy and chronic white matter degeneration.   There is a small chronic lacunar infarct in the right basal ganglia.   There is no evidence of hydrocephalus. No extra-axial collections are   noted.    The bone windows demonstrate no gross osseous abnormalities.    Impression:  1. no acute findings.

## 2019-05-09 NOTE — CONSULT NOTE ADULT - PROBLEM SELECTOR RECOMMENDATION 5
Debility likely multifactorial and progressive. Bedbound. Stage 2 Pressure Injury to the R. Gluteus and L. Ischial areas; Stage 1 Pressure Injury to the Bilateral Lat Foot areas.  Per son pt is able to take 10 steps with one person assist.  Seen by physical therapy with discharge plan to [facility]. Frequent positioning

## 2019-05-09 NOTE — DISCHARGE NOTE NURSING/CASE MANAGEMENT/SOCIAL WORK - NSDCDPATPORTLINK_GEN_ALL_CORE
You can access the [facility] Patient Portal, offered by [facility], by registering with the following website: [email or web address]

## 2019-05-09 NOTE — PROGRESS NOTE ADULT - PROBLEM SELECTOR PLAN 2
Patient presented with initially elevated troponin, BNP 5k,   Echo noted with EF 15-20%, gr3 DD, moderate conc LVH, severely dilated LA, increased RV systolic pressure.   -patient on iv lasix, aldactone, acei as per cardio Dr Henley  -monitor BP, BMP

## 2019-05-09 NOTE — PROGRESS NOTE ADULT - REASON FOR ADMISSION
shortness of breath

## 2019-05-09 NOTE — PROGRESS NOTE ADULT - PROBLEM SELECTOR PLAN 1
P/w fever 102.4, , leukocytosis 16, encephalopathy, and hypoxia  - CXR showing opacity of left upper lobe  - Likely complicated pneumonia as patient is from [facility]  - Failed outpatient PO abx in NH  - S/p vanc + cefepime + 2L NS bolus in ED  - CT Chest confirms left UL pna  - C/w zosyn for complex pneumonia d8/10, d.c plan on ceftin po  - Originally on bipap,switched from HFNC to NC, saturating well  - Blood cx ngtd; Strep and RVP negative  - ABG with pH 7.5, compensated metabolic alkalosis  - ID consult Dr Velez P/w fever 102.4, , leukocytosis 16, encephalopathy, and hypoxia  - CXR showing opacity of left upper lobe  - Likely complicated pneumonia as patient is from [facility]  - Failed outpatient PO abx in NH  - S/p vanc + cefepime + 2L NS bolus in ED  - CT Chest confirms left UL pna  - C/w zosyn for complex pneumonia d8/10, d.c plan on ceftin po  - Originally on bipap,switched from HFNC to NC, saturating well  - Blood cx ngtd; Strep and RVP negative  - ABG with pH 7.5, compensated metabolic alkalosis  - Patient desaturating to 87% on room air, requiring supplemental oxygen   - ID consult Dr Velez

## 2021-07-22 NOTE — ED PROVIDER NOTE - CARDIAC HEART SOUNDS
----- Message from Aydee Crisostomo sent at 7/22/2021  9:55 AM CDT -----  Regarding: Lab Test or Test Related Question  Contact: [phone]  I don't understand the results that I have been getting. Can you please explain them in terms I will understand?   S1-S2

## 2022-02-15 NOTE — ED ADULT NURSE NOTE - MUSCULOSKELETAL ASSESSMENT
- - - Drysol Counseling:  I discussed with the patient the risks of drysol/aluminum chloride including but not limited to skin rash, itching, irritation, burning.

## 2023-07-17 NOTE — DISCHARGE NOTE PROVIDER - NSDCQMCOGNITION_NEU_ALL_CORE
Difficulty concentrating/Difficulty making decisions/Difficulty remembering Closure 2 Information: This tab is for additional flaps and grafts, including complex repair and grafts and complex repair and flaps. You can also specify a different location for the additional defect, if the location is the same you do not need to select a new one. We will insert the automated text for the repair you select below just as we do for solitary flaps and grafts. Please note that at this time if you select a location with a different insurance zone you will need to override the ICD10 and CPT if appropriate.

## 2024-04-05 NOTE — ED PROVIDER NOTE - CPE EDP SKIN NORM
[Takes medication as prescribed] : takes [None] : Patient does not have any barriers to medication adherence normal...

## 2024-09-25 NOTE — PROGRESS NOTE ADULT - ASSESSMENT
No Show#1    84 year old male PMH CAD, CVA, CKD, BPH, Parkinson's, dementia, Afib on coumadin, HTN, CHF, PVD, HLD was brought in by ambulance from [facility] for shortness of breath,Lt sided pneumonia, elevated INR, and + troponins due to demand ischemia-Type II MI,acute systolic.  1.D/C Tele monitoring.  2.ABX.  3.Afib-dig .125mg qd, lopressor 50mg q12h, coumadin INR 2-3.  4.CHF-po lasix, aldactone, low dose ace.  5.CAD-asa,b blocker,statin.  6.CVA-coumadin,statin.  7.BPH-flomax,proscar.  8.Severe LV dysfunction on echo, d/w son no further cardiac testing and continue medical management.